# Patient Record
Sex: FEMALE | Race: OTHER | URBAN - METROPOLITAN AREA
[De-identification: names, ages, dates, MRNs, and addresses within clinical notes are randomized per-mention and may not be internally consistent; named-entity substitution may affect disease eponyms.]

---

## 2017-06-25 ENCOUNTER — INPATIENT (INPATIENT)
Facility: HOSPITAL | Age: 57
LOS: 3 days | Discharge: HOME | End: 2017-06-29
Attending: INTERNAL MEDICINE

## 2017-06-25 DIAGNOSIS — I67.83 POSTERIOR REVERSIBLE ENCEPHALOPATHY SYNDROME: ICD-10-CM

## 2017-06-25 DIAGNOSIS — J96.00 ACUTE RESPIRATORY FAILURE, UNSPECIFIED WHETHER WITH HYPOXIA OR HYPERCAPNIA: ICD-10-CM

## 2017-06-25 DIAGNOSIS — I10 ESSENTIAL (PRIMARY) HYPERTENSION: ICD-10-CM

## 2017-06-25 DIAGNOSIS — E16.2 HYPOGLYCEMIA, UNSPECIFIED: ICD-10-CM

## 2017-06-25 DIAGNOSIS — T40.601A POISONING BY UNSPECIFIED NARCOTICS, ACCIDENTAL (UNINTENTIONAL), INITIAL ENCOUNTER: ICD-10-CM

## 2017-06-25 DIAGNOSIS — F93.0 SEPARATION ANXIETY DISORDER OF CHILDHOOD: ICD-10-CM

## 2017-06-25 DIAGNOSIS — E11.9 TYPE 2 DIABETES MELLITUS WITHOUT COMPLICATIONS: ICD-10-CM

## 2017-06-25 DIAGNOSIS — J18.0 BRONCHOPNEUMONIA, UNSPECIFIED ORGANISM: ICD-10-CM

## 2017-06-25 DIAGNOSIS — R41.89 OTHER SYMPTOMS AND SIGNS INVOLVING COGNITIVE FUNCTIONS AND AWARENESS: ICD-10-CM
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
## 2017-07-03 DIAGNOSIS — I50.9 HEART FAILURE, UNSPECIFIED: ICD-10-CM

## 2017-07-03 DIAGNOSIS — B19.20 UNSPECIFIED VIRAL HEPATITIS C WITHOUT HEPATIC COMA: ICD-10-CM

## 2017-07-03 DIAGNOSIS — Z78.1 PHYSICAL RESTRAINT STATUS: ICD-10-CM

## 2017-07-03 DIAGNOSIS — F32.9 MAJOR DEPRESSIVE DISORDER, SINGLE EPISODE, UNSPECIFIED: ICD-10-CM

## 2017-07-03 DIAGNOSIS — G40.909 EPILEPSY, UNSPECIFIED, NOT INTRACTABLE, WITHOUT STATUS EPILEPTICUS: ICD-10-CM

## 2017-07-03 DIAGNOSIS — R41.82 ALTERED MENTAL STATUS, UNSPECIFIED: ICD-10-CM

## 2017-07-03 DIAGNOSIS — J44.9 CHRONIC OBSTRUCTIVE PULMONARY DISEASE, UNSPECIFIED: ICD-10-CM
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
## 2017-07-03 DIAGNOSIS — E11.9 TYPE 2 DIABETES MELLITUS WITHOUT COMPLICATIONS: ICD-10-CM

## 2017-07-03 DIAGNOSIS — F11.20 OPIOID DEPENDENCE, UNCOMPLICATED: ICD-10-CM

## 2017-07-03 DIAGNOSIS — I11.0 HYPERTENSIVE HEART DISEASE WITH HEART FAILURE: ICD-10-CM

## 2017-07-03 DIAGNOSIS — J69.0 PNEUMONITIS DUE TO INHALATION OF FOOD AND VOMIT: ICD-10-CM

## 2017-07-03 DIAGNOSIS — F17.210 NICOTINE DEPENDENCE, CIGARETTES, UNCOMPLICATED: ICD-10-CM

## 2017-07-03 DIAGNOSIS — T40.3X1A POISONING BY METHADONE, ACCIDENTAL (UNINTENTIONAL), INITIAL ENCOUNTER: ICD-10-CM

## 2017-07-03 DIAGNOSIS — Z99.81 DEPENDENCE ON SUPPLEMENTAL OXYGEN: ICD-10-CM

## 2017-07-03 DIAGNOSIS — J96.22 ACUTE AND CHRONIC RESPIRATORY FAILURE WITH HYPERCAPNIA: ICD-10-CM

## 2019-09-05 ENCOUNTER — APPOINTMENT (OUTPATIENT)
Dept: VASCULAR SURGERY | Facility: CLINIC | Age: 59
End: 2019-09-05

## 2021-01-20 ENCOUNTER — APPOINTMENT (OUTPATIENT)
Dept: SURGERY | Facility: CLINIC | Age: 61
End: 2021-01-20

## 2023-12-27 ENCOUNTER — APPOINTMENT (OUTPATIENT)
Dept: NEUROLOGY | Facility: CLINIC | Age: 63
End: 2023-12-27

## 2024-02-05 ENCOUNTER — APPOINTMENT (OUTPATIENT)
Dept: NEUROLOGY | Facility: CLINIC | Age: 64
End: 2024-02-05
Payer: MEDICARE

## 2024-02-05 VITALS
SYSTOLIC BLOOD PRESSURE: 185 MMHG | BODY MASS INDEX: 33.13 KG/M2 | HEIGHT: 62 IN | DIASTOLIC BLOOD PRESSURE: 101 MMHG | WEIGHT: 180 LBS | HEART RATE: 49 BPM

## 2024-02-05 PROCEDURE — 99203 OFFICE O/P NEW LOW 30 MIN: CPT

## 2024-02-05 PROCEDURE — G2211 COMPLEX E/M VISIT ADD ON: CPT

## 2024-02-05 RX ORDER — METHADONE HYDROCHLORIDE 10 MG/1
10 TABLET ORAL
Refills: 0 | Status: ACTIVE | COMMUNITY

## 2024-02-05 RX ORDER — HYDROMORPHONE HYDROCHLORIDE 4 MG/1
4 TABLET ORAL
Refills: 0 | Status: ACTIVE | COMMUNITY

## 2024-02-05 RX ORDER — APIXABAN 5 MG/1
5 TABLET, FILM COATED ORAL
Refills: 0 | Status: ACTIVE | COMMUNITY

## 2024-02-05 NOTE — HISTORY OF PRESENT ILLNESS
[FreeTextEntry1] : This is a 63 year old female LH with history of R lung ca (3 years ago), L lung ca (1 year ago), and anal ca (s/p chemo and radiation), now in remission.  DVT and ?PE (on Eliquis), COPD on (3L NC baseline), seizures (seen on paper chart from 2016 -> previously on Keppra) who presents for evaluation of tremors of b/l hands for the past few years, thinks 2-3 years. They have been intermittent but have improved over the past few months. She does not think there is a resting component but with action such as drinking the tremor is bothersome. No changes to handwriting, no problems with fine motor movement. No LE involvement. Thinks anxiety makes it worst. Has been on continuo Has been having LLE pain since the cancer, has not had any imaging. Says pain is in the groin, entire leg, front and back and sharp. Says oncology put her on Dilaudid 4mg and Methadone 10mg. Is trying to establish care with pain management.   Doctors are in [place].   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

## 2024-02-05 NOTE — DISCUSSION/SUMMARY
[FreeTextEntry1] : This is a 63 year old female LH with history of R lung ca (3 years ago), L lung ca (1 year ago), and anal ca (s/p chemo and radiation), now in remission.  DVT and ?PE (on Eliquis), COPD on (3L NC baseline), seizures (seen on paper chart from 2016 -> previously on Keppra) who presents for evaluation of tremors of b/l hands for the past few years, thinks 2-3 years.  Description consistent with essential tremor and resolved after being placed on continuous NC, suspect hypercarbia induced. Given comorbids and now resolved will not treat. LLE pain possibly hip related, will start w/ screening XR and Ortho referral.  Plan: Tremor now resolved, continue to monitor Exercise and lifestyle management XR cervical thoracic and lumbar PT Ortho referral

## 2024-02-05 NOTE — PHYSICAL EXAM
[FreeTextEntry1] : GEN: NAD on 3L NC.  NEURO MENTAL STATUS: AAOx3,  CRANIAL NERVES: II: Pupils equal and reactive, III, IV, VI: EOM intact, +strabismus R eye V: normal sensation in V1, V2, and V3 segments bilaterally VII: no asymmetry, no nasolabial fold flattening VIII: normal hearing to speech IX, X: normal palatal elevation, no uvular deviation XI: 5/5 head turn and 5/5 shoulder shrug bilaterally XII: midline tongue protrusion MOTOR: 5/5 muscle power in b/l shoulder abductors/adductors, elbow flexors/extensors. 5/5 in the RUE. 4/5 with hip flexion, knee flexion/extension, and plantar flexors pain limited REFLEXES: 1/4 throughout SENSORY: Normal to touch, pinprick, temp all limbs. Vibration reduced LLE>RLE COORD: Normal finger to nose. Fine, faint low amplitude high frequency tremor b/l. No rigidty. No dysdiadochokinesia.  GAIT: antalgic w/ walker

## 2024-03-08 ENCOUNTER — APPOINTMENT (OUTPATIENT)
Dept: PAIN MANAGEMENT | Facility: CLINIC | Age: 64
End: 2024-03-08
Payer: MEDICARE

## 2024-03-08 VITALS — BODY MASS INDEX: 35.34 KG/M2 | HEIGHT: 60 IN | WEIGHT: 180 LBS

## 2024-03-08 DIAGNOSIS — R29.898 OTHER SYMPTOMS AND SIGNS INVOLVING THE MUSCULOSKELETAL SYSTEM: ICD-10-CM

## 2024-03-08 DIAGNOSIS — G89.29 OTHER CHRONIC PAIN: ICD-10-CM

## 2024-03-08 DIAGNOSIS — J44.9 CHRONIC OBSTRUCTIVE PULMONARY DISEASE, UNSPECIFIED: ICD-10-CM

## 2024-03-08 DIAGNOSIS — Z85.048 PERSONAL HISTORY OF OTHER MALIGNANT NEOPLASM OF RECTUM, RECTOSIGMOID JUNCTION, AND ANUS: ICD-10-CM

## 2024-03-08 DIAGNOSIS — R25.1 TREMOR, UNSPECIFIED: ICD-10-CM

## 2024-03-08 DIAGNOSIS — Z85.118 PERSONAL HISTORY OF OTHER MALIGNANT NEOPLASM OF BRONCHUS AND LUNG: ICD-10-CM

## 2024-03-08 PROCEDURE — G2211 COMPLEX E/M VISIT ADD ON: CPT

## 2024-03-08 PROCEDURE — 99203 OFFICE O/P NEW LOW 30 MIN: CPT

## 2024-03-08 NOTE — DISCUSSION/SUMMARY
[de-identified] : A discussion regarding available pain management treatment options occurred with the patient. These included interventional, rehabilitative, pharmacological, and alternative modalities. We will proceed with the following:   Medication based treatment options: I had a long discussion with the patient regarding the chronic use of opioids. I advised her that I will not be able to continue her at this current regimen and dosage. I explained in detail that if she would like to continue on this medication, she would have to seek care elsewhere.     Complementary treatment options: - Patient was advised to stay away from any heavy lifting. If needed, she was advised to squat and not bend forward. - Initiate physician directed activity and lifestyle modifications.  I, Sanjay Antonio, attest that this documentation has been prepared under the direction and in the presence of Provider Vic Ford, DO The documentation recorded by the scribe, in my presence, accurately reflects the service I personally performed, and the decisions made by me with my edits as appropriate.   Best Regards, Vic Ford D.O.

## 2024-03-08 NOTE — HISTORY OF PRESENT ILLNESS
[FreeTextEntry1] : HISTORY OF PRESENT ILLNESS: Mrs. Hampton is a 63-year-old female with a history of right lung cancer (3 years ago), Left lung cancer (1 year ago), and anal cancer (s/p chemo and radiation), now in remission. She is complaining of pain is in the groin, entire leg, front and back and sharp. Says oncology put her on Dilaudid 4mg and Methadone 10mg.  Patient describes the pain as severe.  During the last month the pain has been nearly constant with symptoms worsening in no typical pattern. Pain described as constant and daily in nature. Pain is increased with standing, walking, exercise.    ACTIVITIES: Patient could walk 0 blocks before the pain starts. Patient cannot stand before pain starts.  Patient uses a wheelchair at this time.  Patient has difficulty performing household chores, going to work, doing yardwork or shopping, socializing with friends, participating in recreational activities & exercise at this time.   Prior Pain Medications: Methadone, Dilaudid, gabapentin, Tylenol.

## 2024-03-10 PROBLEM — R29.898 LEG WEAKNESS: Status: ACTIVE | Noted: 2024-02-05

## 2024-03-10 PROBLEM — R25.1 TREMOR: Status: ACTIVE | Noted: 2024-02-05

## 2024-03-10 PROBLEM — G89.29 CHRONIC PAIN: Status: ACTIVE | Noted: 2024-03-10

## 2024-03-18 ENCOUNTER — APPOINTMENT (OUTPATIENT)
Dept: ORTHOPEDIC SURGERY | Facility: CLINIC | Age: 64
End: 2024-03-18

## 2024-04-14 ENCOUNTER — INPATIENT (INPATIENT)
Facility: HOSPITAL | Age: 64
LOS: 2 days | Discharge: HOME HEALTH | DRG: 300 | End: 2024-04-17
Attending: STUDENT IN AN ORGANIZED HEALTH CARE EDUCATION/TRAINING PROGRAM | Admitting: INTERNAL MEDICINE
Payer: MEDICARE

## 2024-04-14 VITALS
DIASTOLIC BLOOD PRESSURE: 72 MMHG | RESPIRATION RATE: 16 BRPM | HEART RATE: 95 BPM | WEIGHT: 179.9 LBS | TEMPERATURE: 98 F | OXYGEN SATURATION: 99 % | SYSTOLIC BLOOD PRESSURE: 163 MMHG

## 2024-04-14 DIAGNOSIS — G89.29 OTHER CHRONIC PAIN: ICD-10-CM

## 2024-04-14 DIAGNOSIS — Z87.891 PERSONAL HISTORY OF NICOTINE DEPENDENCE: ICD-10-CM

## 2024-04-14 DIAGNOSIS — J44.9 CHRONIC OBSTRUCTIVE PULMONARY DISEASE, UNSPECIFIED: ICD-10-CM

## 2024-04-14 DIAGNOSIS — I87.2 VENOUS INSUFFICIENCY (CHRONIC) (PERIPHERAL): ICD-10-CM

## 2024-04-14 DIAGNOSIS — D64.9 ANEMIA, UNSPECIFIED: ICD-10-CM

## 2024-04-14 DIAGNOSIS — E16.2 HYPOGLYCEMIA, UNSPECIFIED: ICD-10-CM

## 2024-04-14 DIAGNOSIS — L03.90 CELLULITIS, UNSPECIFIED: ICD-10-CM

## 2024-04-14 DIAGNOSIS — F11.20 OPIOID DEPENDENCE, UNCOMPLICATED: ICD-10-CM

## 2024-04-14 DIAGNOSIS — M16.0 BILATERAL PRIMARY OSTEOARTHRITIS OF HIP: ICD-10-CM

## 2024-04-14 DIAGNOSIS — K43.9 VENTRAL HERNIA WITHOUT OBSTRUCTION OR GANGRENE: ICD-10-CM

## 2024-04-14 DIAGNOSIS — L97.921 NON-PRESSURE CHRONIC ULCER OF UNSPECIFIED PART OF LEFT LOWER LEG LIMITED TO BREAKDOWN OF SKIN: ICD-10-CM

## 2024-04-14 DIAGNOSIS — Z79.01 LONG TERM (CURRENT) USE OF ANTICOAGULANTS: ICD-10-CM

## 2024-04-14 DIAGNOSIS — Z92.21 PERSONAL HISTORY OF ANTINEOPLASTIC CHEMOTHERAPY: ICD-10-CM

## 2024-04-14 DIAGNOSIS — D69.6 THROMBOCYTOPENIA, UNSPECIFIED: ICD-10-CM

## 2024-04-14 DIAGNOSIS — Z99.81 DEPENDENCE ON SUPPLEMENTAL OXYGEN: ICD-10-CM

## 2024-04-14 DIAGNOSIS — C21.0 MALIGNANT NEOPLASM OF ANUS, UNSPECIFIED: ICD-10-CM

## 2024-04-14 DIAGNOSIS — Z92.3 PERSONAL HISTORY OF IRRADIATION: ICD-10-CM

## 2024-04-14 DIAGNOSIS — Z88.0 ALLERGY STATUS TO PENICILLIN: ICD-10-CM

## 2024-04-14 DIAGNOSIS — C34.90 MALIGNANT NEOPLASM OF UNSPECIFIED PART OF UNSPECIFIED BRONCHUS OR LUNG: ICD-10-CM

## 2024-04-14 LAB
ALBUMIN SERPL ELPH-MCNC: 3.5 G/DL — SIGNIFICANT CHANGE UP (ref 3.5–5.2)
ALP SERPL-CCNC: 101 U/L — SIGNIFICANT CHANGE UP (ref 30–115)
ALT FLD-CCNC: 15 U/L — SIGNIFICANT CHANGE UP (ref 0–41)
ANION GAP SERPL CALC-SCNC: 8 MMOL/L — SIGNIFICANT CHANGE UP (ref 7–14)
AST SERPL-CCNC: 24 U/L — SIGNIFICANT CHANGE UP (ref 0–41)
BASOPHILS # BLD AUTO: 0 K/UL — SIGNIFICANT CHANGE UP (ref 0–0.2)
BASOPHILS NFR BLD AUTO: 0 % — SIGNIFICANT CHANGE UP (ref 0–1)
BILIRUB SERPL-MCNC: 0.2 MG/DL — SIGNIFICANT CHANGE UP (ref 0.2–1.2)
BUN SERPL-MCNC: 25 MG/DL — HIGH (ref 10–20)
CALCIUM SERPL-MCNC: 8.6 MG/DL — SIGNIFICANT CHANGE UP (ref 8.4–10.5)
CHLORIDE SERPL-SCNC: 94 MMOL/L — LOW (ref 98–110)
CO2 SERPL-SCNC: 35 MMOL/L — HIGH (ref 17–32)
CREAT SERPL-MCNC: 1 MG/DL — SIGNIFICANT CHANGE UP (ref 0.7–1.5)
EGFR: 63 ML/MIN/1.73M2 — SIGNIFICANT CHANGE UP
EOSINOPHIL # BLD AUTO: 0.15 K/UL — SIGNIFICANT CHANGE UP (ref 0–0.7)
EOSINOPHIL NFR BLD AUTO: 2.6 % — SIGNIFICANT CHANGE UP (ref 0–8)
GLUCOSE SERPL-MCNC: 68 MG/DL — LOW (ref 70–99)
HCT VFR BLD CALC: 24.5 % — LOW (ref 37–47)
HGB BLD-MCNC: 7.7 G/DL — LOW (ref 12–16)
LACTATE SERPL-SCNC: 0.9 MMOL/L — SIGNIFICANT CHANGE UP (ref 0.7–2)
LYMPHOCYTES # BLD AUTO: 0.15 K/UL — LOW (ref 1.2–3.4)
LYMPHOCYTES # BLD AUTO: 2.6 % — LOW (ref 20.5–51.1)
MAGNESIUM SERPL-MCNC: 1.9 MG/DL — SIGNIFICANT CHANGE UP (ref 1.8–2.4)
MANUAL SMEAR VERIFICATION: SIGNIFICANT CHANGE UP
MCHC RBC-ENTMCNC: 31 PG — SIGNIFICANT CHANGE UP (ref 27–31)
MCHC RBC-ENTMCNC: 31.4 G/DL — LOW (ref 32–37)
MCV RBC AUTO: 98.8 FL — SIGNIFICANT CHANGE UP (ref 81–99)
MONOCYTES # BLD AUTO: 0.31 K/UL — SIGNIFICANT CHANGE UP (ref 0.1–0.6)
MONOCYTES NFR BLD AUTO: 5.2 % — SIGNIFICANT CHANGE UP (ref 1.7–9.3)
NEUTROPHILS # BLD AUTO: 5.26 K/UL — SIGNIFICANT CHANGE UP (ref 1.4–6.5)
NEUTROPHILS NFR BLD AUTO: 88.7 % — HIGH (ref 42.2–75.2)
PLAT MORPH BLD: NORMAL — SIGNIFICANT CHANGE UP
PLATELET # BLD AUTO: 123 K/UL — LOW (ref 130–400)
PMV BLD: 9.6 FL — SIGNIFICANT CHANGE UP (ref 7.4–10.4)
POTASSIUM SERPL-MCNC: 5.2 MMOL/L — HIGH (ref 3.5–5)
POTASSIUM SERPL-SCNC: 5.2 MMOL/L — HIGH (ref 3.5–5)
PROT SERPL-MCNC: 7.1 G/DL — SIGNIFICANT CHANGE UP (ref 6–8)
RBC # BLD: 2.48 M/UL — LOW (ref 4.2–5.4)
RBC # FLD: 13.2 % — SIGNIFICANT CHANGE UP (ref 11.5–14.5)
RBC BLD AUTO: ABNORMAL
SODIUM SERPL-SCNC: 137 MMOL/L — SIGNIFICANT CHANGE UP (ref 135–146)
STOMATOCYTES BLD QL SMEAR: SLIGHT — SIGNIFICANT CHANGE UP
VARIANT LYMPHS # BLD: 0.9 % — SIGNIFICANT CHANGE UP (ref 0–5)
WBC # BLD: 5.93 K/UL — SIGNIFICANT CHANGE UP (ref 4.8–10.8)
WBC # FLD AUTO: 5.93 K/UL — SIGNIFICANT CHANGE UP (ref 4.8–10.8)

## 2024-04-14 PROCEDURE — 93925 LOWER EXTREMITY STUDY: CPT

## 2024-04-14 PROCEDURE — 82962 GLUCOSE BLOOD TEST: CPT

## 2024-04-14 PROCEDURE — 99285 EMERGENCY DEPT VISIT HI MDM: CPT

## 2024-04-14 PROCEDURE — 86900 BLOOD TYPING SEROLOGIC ABO: CPT

## 2024-04-14 PROCEDURE — 87070 CULTURE OTHR SPECIMN AEROBIC: CPT

## 2024-04-14 PROCEDURE — 86850 RBC ANTIBODY SCREEN: CPT

## 2024-04-14 PROCEDURE — 80202 ASSAY OF VANCOMYCIN: CPT

## 2024-04-14 PROCEDURE — 83880 ASSAY OF NATRIURETIC PEPTIDE: CPT

## 2024-04-14 PROCEDURE — 73590 X-RAY EXAM OF LOWER LEG: CPT | Mod: 26,LT,RT

## 2024-04-14 PROCEDURE — 80048 BASIC METABOLIC PNL TOTAL CA: CPT

## 2024-04-14 PROCEDURE — 97162 PT EVAL MOD COMPLEX 30 MIN: CPT | Mod: GP

## 2024-04-14 PROCEDURE — 85027 COMPLETE CBC AUTOMATED: CPT

## 2024-04-14 PROCEDURE — 83010 ASSAY OF HAPTOGLOBIN QUANT: CPT

## 2024-04-14 PROCEDURE — 97116 GAIT TRAINING THERAPY: CPT | Mod: GP

## 2024-04-14 PROCEDURE — 83735 ASSAY OF MAGNESIUM: CPT

## 2024-04-14 PROCEDURE — 93005 ELECTROCARDIOGRAM TRACING: CPT

## 2024-04-14 PROCEDURE — 86901 BLOOD TYPING SEROLOGIC RH(D): CPT

## 2024-04-14 PROCEDURE — 93970 EXTREMITY STUDY: CPT

## 2024-04-14 PROCEDURE — 36415 COLL VENOUS BLD VENIPUNCTURE: CPT

## 2024-04-14 RX ORDER — METHADONE HYDROCHLORIDE 40 MG/1
30 TABLET ORAL DAILY
Refills: 0 | Status: DISCONTINUED | OUTPATIENT
Start: 2024-04-14 | End: 2024-04-15

## 2024-04-14 RX ORDER — MORPHINE SULFATE 50 MG/1
6 CAPSULE, EXTENDED RELEASE ORAL ONCE
Refills: 0 | Status: DISCONTINUED | OUTPATIENT
Start: 2024-04-14 | End: 2024-04-14

## 2024-04-14 RX ORDER — ONDANSETRON 8 MG/1
4 TABLET, FILM COATED ORAL EVERY 8 HOURS
Refills: 0 | Status: DISCONTINUED | OUTPATIENT
Start: 2024-04-14 | End: 2024-04-17

## 2024-04-14 RX ORDER — ACETAMINOPHEN 500 MG
650 TABLET ORAL EVERY 6 HOURS
Refills: 0 | Status: DISCONTINUED | OUTPATIENT
Start: 2024-04-14 | End: 2024-04-15

## 2024-04-14 RX ORDER — LANOLIN ALCOHOL/MO/W.PET/CERES
3 CREAM (GRAM) TOPICAL AT BEDTIME
Refills: 0 | Status: DISCONTINUED | OUTPATIENT
Start: 2024-04-14 | End: 2024-04-17

## 2024-04-14 RX ORDER — VANCOMYCIN HCL 1 G
1000 VIAL (EA) INTRAVENOUS ONCE
Refills: 0 | Status: COMPLETED | OUTPATIENT
Start: 2024-04-14 | End: 2024-04-14

## 2024-04-14 RX ORDER — AZTREONAM 2 G
2000 VIAL (EA) INJECTION ONCE
Refills: 0 | Status: COMPLETED | OUTPATIENT
Start: 2024-04-14 | End: 2024-04-14

## 2024-04-14 RX ADMIN — Medication 250 MILLIGRAM(S): at 16:28

## 2024-04-14 RX ADMIN — Medication 50 MILLIGRAM(S): at 16:28

## 2024-04-14 NOTE — ED PROVIDER NOTE - PHYSICAL EXAMINATION
CONSTITUTIONAL: NAD  SKIN: Warm dry  HEAD: NCAT  EYES: NL inspection  ENT: MMM  NECK: Supple; non tender.  CARD: RRR  RESP: CTAB  ABD: S/NT no R/G  EXT: +BL pedal edema, erythema and warmth to shins with clear discharge, abrasion to LT shin with purulence  NEURO: Grossly unremarkable  PSYCH: Cooperative, appropriate.

## 2024-04-14 NOTE — ED PROVIDER NOTE - OBJECTIVE STATEMENT
63-year-old female history of COPD on LT, chronic pain, ambulates normally with wheelchair presents with bilateral leg pain and bilateral leg swelling.  Patient admits began having weeping of her lower extremities redness and pain past few days.  Cipro PCP started clindamycin which she took 2 days worth.  Admits was admitted to [facility] 3 months ago with similar symptoms.  States a subjective fever.  Recently saw pain management and started on methadone and oxycodone for pain    Has not been able to ambulate recently 2/2 pain

## 2024-04-14 NOTE — H&P ADULT - ATTENDING COMMENTS
#LLE ulcer  xray periosteal thickening tibia  bcx  deep wcx  vanco, ceftriaxone, flagyl  no intervention per burn  art duplex  id eval    #Progress Note Handoff  Pending (specify): iv abx, id eval, bcx, wcx  Family discussion: d/w pt, daughter at bedside  Disposition: snf vs. home

## 2024-04-14 NOTE — H&P ADULT - HISTORY OF PRESENT ILLNESS
63F w/ PMHx COPD on LT, Chronic Pain and h/o Opioid Abuse presents to ED For bilateral leg pain and bilateral leg swelling. Patient admits to having LE weeping, redness and pain for the past few days with associated subjective fever. Her PCP started her on clindamycin which she took 2 days worth. States she was admitted to [facility] 3 months ago with similar symptoms. Of note, patient was recently seen by pain management and started on methadone and oxycodone for pain. She has been unable to ambulate due to pain (baseline wheelchair).    Vitals: Temp 97.9F, /72, HR 95, RR 16, SpO2 99% on RA    Labs: Hgb 7.7 (unknown baseline), Serum Glucose 68, Cr 1.0 (unknown baseline)    Imaging: X ray    In the ED:    Admitted to medicine 63F ex-smoker w/ PMHx COPD on 3L NC, h/o Opioid Abuse, Chronic Pain, h/o Lung Ca s/p Radiation (~2022) and h/o Anal Ca s/p Chemo/Radiation (~2021) presents to ED For bilateral leg pain and bilateral leg swelling. Patient admits to having LE weeping, redness and pain for the past few days with associated subjective fever. Her PCP started her on clindamycin which she took 2 days worth. States she was admitted to [facility] 3 months ago with similar symptoms and treated with IV antibiotics. Of note, patient was recently seen by pain management and started on Methadone and Dilaudid for pain. She has been unable to ambulate due to pain (baseline rolling walker).    Vitals: Temp 97.9F, /72, HR 95, RR 16, SpO2 99% on RA    Labs: Hgb 7.7 (unknown baseline), Serum Glucose 68, Cr 1.0 (unknown baseline)    Imaging: Xray of b/l Tib-Fib shows no fracture    In the ED:  - Aztreonam and Vanc IV x1  - Morphine 6mg IV x1    Admitted to medicine for management of b/l LE wound. 63F ex-smoker w/ PMHx COPD on 3L NC, h/o Opioid Abuse, Chronic Pain, h/o Lung Ca s/p Radiation (~2022) and h/o Anal Ca s/p Chemo/Radiation (~2021) presents to ED For bilateral leg pain and bilateral leg swelling. Patient admits to having LE weeping, redness and pain for the past few days with associated subjective fever. Her PCP started her on clindamycin which she took 2 days worth. States she was admitted to [facility] 3 months ago with similar symptoms and treated with IV antibiotics. Of note, patient was recently seen by pain management and started on Methadone and Dilaudid for pain. She has been unable to ambulate due to pain (baseline rolling walker).    Vitals: Temp 97.9F, /72, HR 95, RR 16, SpO2 99% on RA    Labs: Hgb 7.7 (unknown baseline), Platelet 123 (unknown baseline), Serum Glucose 68, Cr 1.0 (unknown baseline)    Imaging: Xray of b/l Tib-Fib shows no fracture    In the ED:  - Aztreonam and Vanc IV x1  - Morphine 6mg IV x1    Admitted to medicine for management of b/l LE wound.

## 2024-04-14 NOTE — H&P ADULT - NSHPLABSRESULTS_GEN_ALL_CORE
LABS:  cret                        7.7    5.93  )-----------( 123      ( 14 Apr 2024 16:04 )             24.5     04-14    137  |  94<L>  |  25<H>  ----------------------------<  68<L>  5.2<H>   |  35<H>  |  1.0    Ca    8.6      14 Apr 2024 16:04  Mg     1.9     04-14    TPro  7.1  /  Alb  3.5  /  TBili  0.2  /  DBili  x   /  AST  24  /  ALT  15  /  AlkPhos  101  04-14

## 2024-04-14 NOTE — H&P ADULT - NSHPPHYSICALEXAM_GEN_ALL_CORE
VITALS:   Vital Signs Last 24 Hrs  T(C): 36.8 (14 Apr 2024 16:18), Max: 36.8 (14 Apr 2024 16:18)  T(F): 98.2 (14 Apr 2024 16:18), Max: 98.2 (14 Apr 2024 16:18)  HR: 90 (14 Apr 2024 17:11) (90 - 97)  BP: 130/60 (14 Apr 2024 17:11) (130/60 - 180/73)  RR: 18 (14 Apr 2024 17:11) (16 - 18)  SpO2: 99% (14 Apr 2024 17:11) (96% - 99%)      Parameters below as of 14 Apr 2024 17:11  Patient On (Oxygen Delivery Method): room air      PHYSICAL EXAM:  General: WN/WD NAD  HEENT: PERRLA, EOMI, moist mucous membranes  Neurology: A&Ox3, nonfocal, WAYNE x 4  Respiratory: CTA B/L, normal respiratory effort, no wheezes, crackles, rales  CV: RRR, S1S2, no murmurs, rubs or gallops  Abdominal: Soft, NT, ND +BS, Last BM  Extremities: No edema, + peripheral pulses VITALS:   Vital Signs Last 24 Hrs  T(C): 36.8 (14 Apr 2024 16:18), Max: 36.8 (14 Apr 2024 16:18)  T(F): 98.2 (14 Apr 2024 16:18), Max: 98.2 (14 Apr 2024 16:18)  HR: 90 (14 Apr 2024 17:11) (90 - 97)  BP: 130/60 (14 Apr 2024 17:11) (130/60 - 180/73)  RR: 18 (14 Apr 2024 17:11) (16 - 18)  SpO2: 99% (14 Apr 2024 17:11) (96% - 99%)      Parameters below as of 14 Apr 2024 17:11  Patient On (Oxygen Delivery Method): room air      PHYSICAL EXAM:  General: WN/WD NAD, disheveled  HEENT: NCAT, PERRLA, EOMI, moist mucous membranes  Neurology: A&Ox3, nonfocal, WAYNE x 4  Respiratory: CTA B/L, normal respiratory effort, no wheezes, crackles, rales  CV: RRR, S1S2, no murmurs, rubs or gallops  Abdominal: Soft, NT, ND, +BS, no guarding or rebound, multiple ventral hernias, midline surgical abdominal scar  Extremities: +2 LE edema b/l, b/l open LE wounds, weeping and red VITALS:   Vital Signs Last 24 Hrs  T(C): 36.8 (14 Apr 2024 16:18), Max: 36.8 (14 Apr 2024 16:18)  T(F): 98.2 (14 Apr 2024 16:18), Max: 98.2 (14 Apr 2024 16:18)  HR: 90 (14 Apr 2024 17:11) (90 - 97)  BP: 130/60 (14 Apr 2024 17:11) (130/60 - 180/73)  RR: 18 (14 Apr 2024 17:11) (16 - 18)  SpO2: 99% (14 Apr 2024 17:11) (96% - 99%)      Parameters below as of 14 Apr 2024 17:11  Patient On (Oxygen Delivery Method): room air      PHYSICAL EXAM:  General: WN/WD NAD, disheveled  HEENT: NCAT, PERRLA, EOMI, moist mucous membranes  Neurology: A&Ox3, nonfocal, WAYNE x 4  Respiratory: CTA B/L, normal respiratory effort, no wheezes, crackles, rales  CV: RRR, S1S2, no murmurs, rubs or gallops  Abdominal: Soft, NT, ND, +BS, no guarding or rebound, multiple ventral hernias, midline surgical abdominal scar  Extremities: +2 LE edema b/l, b/l open LE wounds, weeping and erythematous, chronic changes

## 2024-04-14 NOTE — ED PROVIDER NOTE - CLINICAL SUMMARY MEDICAL DECISION MAKING FREE TEXT BOX
Patient with past medical history opiate abuse on hydromorphone and methadone presents with leg infection not improved after clindamycin admitted to [facility] with similar symptoms    Independent interpretation of the Xray film(s) performed by: Dawson Grossman fracture    Appropriate medications for patient's presenting complaints were ordered and effects were reassessed.   Patient's external records were reviewed.    Escalation to admission/observation was considered.  At this time, patient requires inpatient hospitalization- monitored setting.

## 2024-04-14 NOTE — ED ADULT NURSE NOTE - NSFALLHARMRISKINTERV_ED_ALL_ED

## 2024-04-14 NOTE — H&P ADULT - ASSESSMENT
63F ex-smoker w/ PMHx COPD on 3L NC, h/o Opioid Abuse, Chronic Pain, h/o Lung Ca s/p Radiation (~2022) and h/o Anal Ca s/p Chemo/Radiation (~2021) presents to ED For bilateral leg pain and bilateral leg swelling. Patient admits to having LE weeping, redness and pain for the past few days with associated subjective fever. Her PCP started her on clindamycin which she took 2 days worth. States she was admitted to [facility] 3 months ago with similar symptoms and treated with IV antibiotics. Of note, patient was recently seen by pain management and started on Methadone and Dilaudid for pain. She has been unable to ambulate due to pain (baseline rolling walker).    #b/l LE Stasis Wound vs Cellulitis  - b/l LE open wounds, weeping, red  - Vitals: Temp 97.9F, /72, HR 95, RR 16, SpO2 99% on RA  - WBC wnl  - Aztreonam and Vanc IV x1  - patient states shes not sure if she actually has a penicillin allergy, states that her mother told her as a kid    #b/l Hip Pain  #Chronic Pain  #h/o Opioid Abuse  - Xray of b/l Tib-Fib shows no fracture  - Morphine 6mg IV x1    #COPD on 3L NC at home  - satting well on 3L NC  - no wheezing, not SOB, not in exacerbation    Labs: Hgb 7.7 (unknown baseline), Serum Glucose 68, Cr 1.0 (unknown baseline) 63F ex-smoker w/ PMHx COPD on 3L NC, h/o Opioid Abuse, Chronic Pain, h/o Lung Ca s/p Radiation (~2022) and h/o Anal Ca s/p Chemo/Radiation (~2021) presents to ED For bilateral leg pain and bilateral leg swelling. Patient admits to having LE weeping, redness and pain for the past few days with associated subjective fever. Her PCP started her on clindamycin which she took 2 days worth. States she was admitted to [facility] 3 months ago with similar symptoms and treated with IV antibiotics. Of note, patient was recently seen by pain management and started on Methadone and Dilaudid for pain. She has been unable to ambulate due to pain (baseline rolling walker).    #b/l LE Stasis Wound vs Cellulitis  - b/l LE open wounds, weeping, red  - Vitals: Temp 97.9F, /72, HR 95, RR 16, SpO2 99% on RA  - WBC wnl  - Aztreonam and Vanc IV x1  - patient states shes not sure if she actually has a penicillin allergy, states that her mother told her as a kid    #b/l Hip Pain  #Chronic Pain  #Methadone Dependence  #h/o Opioid Abuse  - Xray of b/l Tib-Fib shows no fracture  - s/p Morphine 6mg IV x1 in ED  - patient states she was prescribed Methadone 50mg q8hrs standing and Dilaudid 8mg PO q4hrs PRN  - iSTOP reviewed, patient is only currently prescribed Methadone 30mg qD  - patient states it was an insurance issue and that shes actually prescribed 50mg TID  - patient follows Dr. Harvey Benton [phone] for pain management ([place])  -   - f/u pain management consult    #COPD on 3L NC at home  - satting well on 3L NC  - no wheezing, not SOB, not in exacerbation    #h/o Lung Ca s/p Radiation - unknown stage  #h/o Anal Ca s/p Chemo/Radiation - unknown stage  - patient states she is following Dr. Serra at [facility]  - o/p f/u    #Anemia  - Hgb 7.7 (unknown baseline)  - no clear signs of active hemorrhage except from open wound in b/l LE  - monitor H&H  - active T&S  - transfuse <7    Serum Glucose 68, Cr 1.0 (unknown baseline)     63F ex-smoker w/ PMHx COPD on 3L NC, h/o Opioid Abuse, Chronic Pain, h/o Lung Ca s/p Radiation (~2022) and h/o Anal Ca s/p Chemo/Radiation (~2021) presents to ED For bilateral leg pain and bilateral leg swelling. Patient admits to having LE weeping, redness and pain for the past few days with associated subjective fever. Her PCP started her on clindamycin which she took 2 days worth. States she was admitted to [facility] 3 months ago with similar symptoms and treated with IV antibiotics. Of note, patient was recently seen by pain management and started on Methadone and Dilaudid for pain. She has been unable to ambulate due to pain (baseline rolling walker).    #b/l LE Stasis Wound vs Cellulitis  #Possible PAD / PVD w/ questionable Stent  - b/l LE open wounds, weeping, red  - Vitals: Temp 97.9F, /72, HR 95, RR 16, SpO2 99% on RA  - WBC wnl  - Aztreonam and Vanc IV x1  - patient states shes not sure if she actually has a penicillin allergy, states that her mother told her as a kid  - c/w home Eliquis 5mg BID - patient states she is on Eliquis for balloon/stent in LLE  - f/u BCx  - f/u BURN consult for eval and recs    #b/l Hip Pain likely Severe OA  #Chronic Pain  #Methadone Dependence  #h/o Opioid Abuse  - Xray of b/l Tib-Fib shows no fracture  - no red flag signs  - s/p Morphine 6mg IV x1 in ED  - patient states she was prescribed Methadone 50mg q8hrs standing and Dilaudid 8mg PO q4hrs PRN  - iSTOP reviewed, patient is only currently prescribed Methadone 30mg qD  - patient states it was an insurance issue and that shes actually prescribed 50mg TID  - patient follows Dr. Harvey Benton [phone] for pain management ([place])  - start Tylenol 975mg q8hr standing  - f/u pain management consult  - f/u PT    #Hypoglycemia  - Serum glucose 68 on admission  - not symptomatic  - monitor FS  - give juice of IV dextrose PRN    #Normocytic Anemia  #Thrombocytopenia  - Hgb 7.7 (unknown baseline)  - Platelet 123 (unknown baseline)  - no clear signs of active hemorrhage except from open wound in b/l LE  - patient is on Eliquis  - monitor H&H  - active T&S  - transfuse <7    #COPD on 3L NC at home  #Ex-smoker  - satting well on 3L NC  - no wheezing, not SOB, not in exacerbation  - c/w home Albuterol inhaler PRN    #h/o Lung Ca s/p Radiation - unknown stage  #h/o Anal Ca s/p Chemo/Radiation - unknown stage  - patient states she is [place] [facility]  - o/p f/u    #Multiple Ventral Hernias  #h/o Open Abdominal Surgery  - denies pain, +BS, last BM yesterday  - f/u o/p    #DVT ppx: Eliquis 5mg BID  #GI ppx: n/a  #Diet: Regular - DASH/TLC  #Activity: IAT - uses rolling walker at baseline  #Code Status: Full Code  #Dispo: from home, acute 63F ex-smoker w/ PMHx COPD on 3L NC, h/o Opioid Abuse, Chronic Pain, h/o Lung Ca s/p Radiation (~2022) and h/o Anal Ca s/p Chemo/Radiation (~2021) presents to ED For bilateral leg pain and bilateral leg swelling. Patient admits to having LE weeping, redness and pain for the past few days with associated subjective fever. Her PCP started her on clindamycin which she took 2 days worth. States she was admitted to [facility] 3 months ago with similar symptoms and treated with IV antibiotics. Of note, patient was recently seen by pain management and started on Methadone and Dilaudid for pain. She has been unable to ambulate due to pain (baseline rolling walker).    #b/l LE Stasis Wound vs Cellulitis  #Possible PAD / PVD w/ questionable Stent  - b/l LE open wounds, weeping, red  - Vitals: Temp 97.9F, /72, HR 95, RR 16, SpO2 99% on RA  - WBC wnl  - Aztreonam and Vanc IV x1  - patient states shes not sure if she actually has a penicillin allergy, states that her mother told her as a kid  - c/w home Eliquis 5mg BID - patient states she is on Eliquis for balloon/stent in LLE  - f/u BCx  - f/u b/l LE venous duplex  - f/u BURN consult for eval and recs    #b/l Hip Pain likely Severe OA  #Chronic Pain  #Methadone Dependence  #h/o Opioid Abuse  - Xray of b/l Tib-Fib shows no fracture  - no red flag signs  - s/p Morphine 6mg IV x1 in ED  - patient states she was prescribed Methadone 50mg q8hrs standing and Dilaudid 8mg PO q4hrs PRN  - iSTOP reviewed, patient is only currently prescribed Methadone 30mg qD  - patient states it was an insurance issue and that shes actually prescribed 50mg TID  - patient follows Dr. Harvey Benton [phone] for pain management ([place])  - start Tylenol 975mg q8hr standing  - f/u pain management consult  - f/u PT    #Hypoglycemia  - Serum glucose 68 on admission  - not symptomatic  - monitor FS  - give juice of IV dextrose PRN    #Normocytic Anemia  #Thrombocytopenia  - Hgb 7.7 (unknown baseline)  - Platelet 123 (unknown baseline)  - no clear signs of active hemorrhage except from open wound in b/l LE  - patient is on Eliquis  - monitor H&H  - active T&S  - transfuse <7    #COPD on 3L NC at home  #Ex-smoker  - satting well on 3L NC  - no wheezing, not SOB, not in exacerbation  - c/w home Albuterol inhaler PRN    #h/o Lung Ca s/p Radiation - unknown stage  #h/o Anal Ca s/p Chemo/Radiation - unknown stage  - patient states she is following Dr. Serra at [facility] [place]  - o/p f/u    #Multiple Ventral Hernias  #h/o Open Abdominal Surgery  - denies pain, +BS, last BM yesterday  - f/u o/p    #DVT ppx: Eliquis 5mg BID  #GI ppx: n/a  #Diet: Regular - DASH/TLC  #Activity: IAT - uses rolling walker at baseline  #Code Status: Full Code  #Dispo: from home, acute 63F ex-smoker w/ PMHx COPD on 3L NC, h/o Opioid Abuse, Chronic Pain, h/o Lung Ca s/p Radiation (~2022) and h/o Anal Ca s/p Chemo/Radiation (~2021) presents to ED For bilateral leg pain and bilateral leg swelling. Patient admits to having LE weeping, redness and pain for the past few days with associated subjective fever. Her PCP started her on clindamycin which she took 2 days worth. States she was admitted to [facility] 3 months ago with similar symptoms and treated with IV antibiotics. Of note, patient was recently seen by pain management and started on Methadone and Dilaudid for pain. She has been unable to ambulate due to pain (baseline rolling walker).    #b/l LE Stasis Dermatitis - Doubt Cellulitis  #Possible PAD / PVD w/ questionable Stent or LLE  - b/l LE open wounds, weeping, red  - Vitals: Temp 97.9F, /72, HR 95, RR 16, SpO2 99% on RA  - WBC wnl  - Aztreonam and Vanc IV x1  - patient states shes not sure if she actually has a penicillin allergy, states that her mother told her as a kid  - doubt cellulitis, monitor off abx for now  - c/w home Eliquis 5mg BID - patient states she is on Eliquis for balloon/stent in LLE  - f/u BCx  - f/u b/l LE arterial and venous duplex  - f/u BURN consult for eval and recs    #b/l Hip Pain likely Severe OA  #Chronic Pain  #Methadone Dependence  #h/o Opioid Abuse  - Xray of b/l Tib-Fib shows no fracture  - no red flag signs  - s/p Morphine 6mg IV x1 in ED  - patient states she was prescribed Methadone 50mg q8hrs standing and Dilaudid 8mg PO q4hrs PRN  - iSTOP reviewed, patient is only currently prescribed Methadone 30mg qD  - patient states it was an insurance issue and that shes actually prescribed 50mg TID  - patient follows Dr. Harvey Benton [phone] for pain management ([place])  - start Tylenol 975mg q8hr standing  - f/u pain management consult  - f/u PT    #Hypoglycemia  - Serum glucose 68 on admission  - not symptomatic  - monitor FS  - give juice of IV dextrose PRN    #Normocytic Anemia  #Thrombocytopenia  - Hgb 7.7 (unknown baseline)  - Platelet 123 (unknown baseline)  - no clear signs of active hemorrhage except from open wound in b/l LE  - patient is on Eliquis  - monitor H&H  - active T&S  - transfuse <7    #COPD on 3L NC at home  #Ex-smoker  - satting well on 3L NC  - no wheezing, not SOB, not in exacerbation  - c/w home Albuterol inhaler PRN    #h/o Lung Ca s/p Radiation - unknown stage  #h/o Anal Ca s/p Chemo/Radiation - unknown stage  - patient states she is following Dr. Serra at [facility] [place]  - o/p f/u    #Multiple Ventral Hernias  #h/o Open Abdominal Surgery  - denies pain, +BS, last BM yesterday  - f/u o/p    #DVT ppx: Eliquis 5mg BID  #GI ppx: n/a  #Diet: Regular - DASH/TLC  #Activity: IAT - uses rolling walker at baseline  #Code Status: Full Code  #Dispo: from home, acute

## 2024-04-14 NOTE — ED ADULT TRIAGE NOTE - CHIEF COMPLAINT QUOTE
pt BIBA for open wounds on bilat lower legs. pt is on antibiotics for wounds and c/o pain and swelling

## 2024-04-15 LAB
ANION GAP SERPL CALC-SCNC: 12 MMOL/L — SIGNIFICANT CHANGE UP (ref 7–14)
BLD GP AB SCN SERPL QL: SIGNIFICANT CHANGE UP
BUN SERPL-MCNC: 25 MG/DL — HIGH (ref 10–20)
CALCIUM SERPL-MCNC: 8.9 MG/DL — SIGNIFICANT CHANGE UP (ref 8.4–10.4)
CHLORIDE SERPL-SCNC: 95 MMOL/L — LOW (ref 98–110)
CO2 SERPL-SCNC: 31 MMOL/L — SIGNIFICANT CHANGE UP (ref 17–32)
CREAT SERPL-MCNC: 0.8 MG/DL — SIGNIFICANT CHANGE UP (ref 0.7–1.5)
EGFR: 82 ML/MIN/1.73M2 — SIGNIFICANT CHANGE UP
GLUCOSE BLDC GLUCOMTR-MCNC: 127 MG/DL — HIGH (ref 70–99)
GLUCOSE BLDC GLUCOMTR-MCNC: 80 MG/DL — SIGNIFICANT CHANGE UP (ref 70–99)
GLUCOSE SERPL-MCNC: 76 MG/DL — SIGNIFICANT CHANGE UP (ref 70–99)
HCT VFR BLD CALC: 23.8 % — LOW (ref 37–47)
HGB BLD-MCNC: 7.4 G/DL — LOW (ref 12–16)
MAGNESIUM SERPL-MCNC: 1.9 MG/DL — SIGNIFICANT CHANGE UP (ref 1.8–2.4)
MCHC RBC-ENTMCNC: 31 PG — SIGNIFICANT CHANGE UP (ref 27–31)
MCHC RBC-ENTMCNC: 31.1 G/DL — LOW (ref 32–37)
MCV RBC AUTO: 99.6 FL — HIGH (ref 81–99)
NRBC # BLD: 0 /100 WBCS — SIGNIFICANT CHANGE UP (ref 0–0)
NT-PROBNP SERPL-SCNC: 902 PG/ML — HIGH (ref 0–300)
PLATELET # BLD AUTO: 117 K/UL — LOW (ref 130–400)
PMV BLD: 9.8 FL — SIGNIFICANT CHANGE UP (ref 7.4–10.4)
POTASSIUM SERPL-MCNC: 4.6 MMOL/L — SIGNIFICANT CHANGE UP (ref 3.5–5)
POTASSIUM SERPL-SCNC: 4.6 MMOL/L — SIGNIFICANT CHANGE UP (ref 3.5–5)
RBC # BLD: 2.39 M/UL — LOW (ref 4.2–5.4)
RBC # FLD: 13.3 % — SIGNIFICANT CHANGE UP (ref 11.5–14.5)
SODIUM SERPL-SCNC: 138 MMOL/L — SIGNIFICANT CHANGE UP (ref 135–146)
WBC # BLD: 4.21 K/UL — LOW (ref 4.8–10.8)
WBC # FLD AUTO: 4.21 K/UL — LOW (ref 4.8–10.8)

## 2024-04-15 PROCEDURE — 93970 EXTREMITY STUDY: CPT | Mod: 26

## 2024-04-15 PROCEDURE — 99221 1ST HOSP IP/OBS SF/LOW 40: CPT

## 2024-04-15 PROCEDURE — 99221 1ST HOSP IP/OBS SF/LOW 40: CPT | Mod: GC

## 2024-04-15 PROCEDURE — 99223 1ST HOSP IP/OBS HIGH 75: CPT

## 2024-04-15 PROCEDURE — 93925 LOWER EXTREMITY STUDY: CPT | Mod: 26

## 2024-04-15 RX ORDER — ALBUTEROL 90 UG/1
2 AEROSOL, METERED ORAL EVERY 6 HOURS
Refills: 0 | Status: DISCONTINUED | OUTPATIENT
Start: 2024-04-15 | End: 2024-04-17

## 2024-04-15 RX ORDER — APIXABAN 2.5 MG/1
1 TABLET, FILM COATED ORAL
Refills: 0 | DISCHARGE

## 2024-04-15 RX ORDER — VANCOMYCIN HCL 1 G
1250 VIAL (EA) INTRAVENOUS EVERY 12 HOURS
Refills: 0 | Status: DISCONTINUED | OUTPATIENT
Start: 2024-04-15 | End: 2024-04-17

## 2024-04-15 RX ORDER — ACETAMINOPHEN 500 MG
975 TABLET ORAL EVERY 8 HOURS
Refills: 0 | Status: DISCONTINUED | OUTPATIENT
Start: 2024-04-15 | End: 2024-04-17

## 2024-04-15 RX ORDER — HYDROMORPHONE HYDROCHLORIDE 2 MG/ML
2 INJECTION INTRAMUSCULAR; INTRAVENOUS; SUBCUTANEOUS ONCE
Refills: 0 | Status: DISCONTINUED | OUTPATIENT
Start: 2024-04-15 | End: 2024-04-15

## 2024-04-15 RX ORDER — METRONIDAZOLE 500 MG
500 TABLET ORAL EVERY 8 HOURS
Refills: 0 | Status: DISCONTINUED | OUTPATIENT
Start: 2024-04-15 | End: 2024-04-17

## 2024-04-15 RX ORDER — HYDROMORPHONE HYDROCHLORIDE 2 MG/ML
8 INJECTION INTRAMUSCULAR; INTRAVENOUS; SUBCUTANEOUS EVERY 4 HOURS
Refills: 0 | Status: DISCONTINUED | OUTPATIENT
Start: 2024-04-15 | End: 2024-04-16

## 2024-04-15 RX ORDER — METHADONE HYDROCHLORIDE 40 MG/1
50 TABLET ORAL THREE TIMES A DAY
Refills: 0 | Status: DISCONTINUED | OUTPATIENT
Start: 2024-04-15 | End: 2024-04-17

## 2024-04-15 RX ORDER — ALBUTEROL 90 UG/1
2 AEROSOL, METERED ORAL
Refills: 0 | DISCHARGE

## 2024-04-15 RX ORDER — CEFTRIAXONE 500 MG/1
1000 INJECTION, POWDER, FOR SOLUTION INTRAMUSCULAR; INTRAVENOUS EVERY 24 HOURS
Refills: 0 | Status: DISCONTINUED | OUTPATIENT
Start: 2024-04-15 | End: 2024-04-17

## 2024-04-15 RX ORDER — HYDROMORPHONE HYDROCHLORIDE 2 MG/ML
0.5 INJECTION INTRAMUSCULAR; INTRAVENOUS; SUBCUTANEOUS ONCE
Refills: 0 | Status: DISCONTINUED | OUTPATIENT
Start: 2024-04-15 | End: 2024-04-15

## 2024-04-15 RX ORDER — METHADONE HYDROCHLORIDE 40 MG/1
50 TABLET ORAL ONCE
Refills: 0 | Status: DISCONTINUED | OUTPATIENT
Start: 2024-04-15 | End: 2024-04-15

## 2024-04-15 RX ORDER — APIXABAN 2.5 MG/1
5 TABLET, FILM COATED ORAL EVERY 12 HOURS
Refills: 0 | Status: DISCONTINUED | OUTPATIENT
Start: 2024-04-15 | End: 2024-04-17

## 2024-04-15 RX ADMIN — APIXABAN 5 MILLIGRAM(S): 2.5 TABLET, FILM COATED ORAL at 05:41

## 2024-04-15 RX ADMIN — Medication 100 MILLIGRAM(S): at 21:48

## 2024-04-15 RX ADMIN — CEFTRIAXONE 100 MILLIGRAM(S): 500 INJECTION, POWDER, FOR SOLUTION INTRAMUSCULAR; INTRAVENOUS at 18:00

## 2024-04-15 RX ADMIN — METHADONE HYDROCHLORIDE 30 MILLIGRAM(S): 40 TABLET ORAL at 00:54

## 2024-04-15 RX ADMIN — APIXABAN 5 MILLIGRAM(S): 2.5 TABLET, FILM COATED ORAL at 18:06

## 2024-04-15 RX ADMIN — HYDROMORPHONE HYDROCHLORIDE 0.5 MILLIGRAM(S): 2 INJECTION INTRAMUSCULAR; INTRAVENOUS; SUBCUTANEOUS at 00:54

## 2024-04-15 RX ADMIN — Medication 1 APPLICATION(S): at 17:51

## 2024-04-15 RX ADMIN — METHADONE HYDROCHLORIDE 50 MILLIGRAM(S): 40 TABLET ORAL at 22:59

## 2024-04-15 RX ADMIN — Medication 166.67 MILLIGRAM(S): at 17:59

## 2024-04-15 RX ADMIN — METHADONE HYDROCHLORIDE 50 MILLIGRAM(S): 40 TABLET ORAL at 14:34

## 2024-04-15 RX ADMIN — Medication 975 MILLIGRAM(S): at 05:41

## 2024-04-15 RX ADMIN — HYDROMORPHONE HYDROCHLORIDE 2 MILLIGRAM(S): 2 INJECTION INTRAMUSCULAR; INTRAVENOUS; SUBCUTANEOUS at 10:39

## 2024-04-15 RX ADMIN — HYDROMORPHONE HYDROCHLORIDE 8 MILLIGRAM(S): 2 INJECTION INTRAMUSCULAR; INTRAVENOUS; SUBCUTANEOUS at 16:10

## 2024-04-15 RX ADMIN — HYDROMORPHONE HYDROCHLORIDE 8 MILLIGRAM(S): 2 INJECTION INTRAMUSCULAR; INTRAVENOUS; SUBCUTANEOUS at 20:23

## 2024-04-15 NOTE — PATIENT PROFILE ADULT - FALL HARM RISK - HARM RISK INTERVENTIONS

## 2024-04-15 NOTE — CONSULT NOTE ADULT - ASSESSMENT
Assessment: 63F ex-smoker w/ PMHx COPD on 3L NC, h/o Opioid Abuse, Chronic Pain, h/o Lung Ca s/p Radiation (~2022) and h/o Anal Ca s/p Chemo/Radiation (~2021) presents to ED For bilateral leg pain and bilateral leg swelling. Burn consulted for wound care recommendations.     Plan:  Wound care washing with soap and water, xeroform/adaptic, kerlex and ACE twice daily by nursing  Compression, elevation and ambulation as tolerated   No indication for surgical debridement at this time  IV abx as indicated/ per ID  Remainder of care per primary team Assessment: 63F ex-smoker w/ PMHx COPD on 3L NC, h/o Opioid Abuse, Chronic Pain, h/o Lung Ca s/p Radiation (~2022) and h/o Anal Ca s/p Chemo/Radiation (~2021) presents to ED For bilateral leg pain and bilateral leg swelling. Burn consulted for wound care recommendations.     Plan:  Wound care washing with soap and water, apply silvadene, adaptic, kerlex and ACE twice daily by nursing  Compression, elevation and ambulation as tolerated   No indication for surgical debridement at this time  IV abx as indicated/ per ID  Remainder of care per primary team Assessment: 63F ex-smoker w/ PMHx COPD on 3L NC, h/o Opioid Abuse, Chronic Pain, h/o Lung Ca s/p Radiation (~2022) and h/o Anal Ca s/p Chemo/Radiation (~2021) presents to ED For bilateral leg pain and bilateral leg swelling. Burn consulted for wound care recommendations.     Plan:  Wound care washing with soap and water, apply silvadene, adaptic, kerlex and ACE once daily by nursing  Compression, elevation and ambulation as tolerated   No indication for surgical debridement at this time  IV abx as indicated/ per ID  Remainder of care per primary team

## 2024-04-15 NOTE — CONSULT NOTE ADULT - SUBJECTIVE AND OBJECTIVE BOX
HPI: 63F with PMH of COPD on 3L NC, opioid use disorder, chronic pain, lung and anal cancer on RT and chemo, here with bilateral leg pain and swelling. Was seen by pain management as outpatient and started on methadone and dilaudid for pain. Palliative called for pain mangement.      PERTINENT PM/SXH:       FAMILY HISTORY:  no pertinent family history      SOCIAL HISTORY:   Significant other/partner[ ]  Children[ ]  Rastafarian/Spirituality:  Substance hx:  [ ]   Tobacco hx:  [ ]   Alcohol hx: [ ]   Living Situation: [ ]Home  [ ]Long term care  [ ]Rehab [ ]Other  Home Services: [ ] HHA [ ] Alisha RN [ ] Hospice  Occupation:  Home Opioid hx:  [ ] Y [ ] N [ ] I-Stop Reference No: [id number]  A	N	O	12/16/2023	12/18/2023	hydromorphone 4 mg tablet	120	20	Harvey Benton	[id number]	Medicare	[facility] #[id number]  A	N	O	12/16/2023	12/18/2023	methadone hcl 10 mg tablet	210	14	Harvey Benton	[id number]	[facility] #[id number]  B	Y	O	04/11/2024	04/13/2024	methadone hcl 10 mg tablet	90	30	Aimee Toledo	[id number]	Medicare	[id number]  B	N	O	05/17/2023	05/17/2023	hydromorphone 4 mg tablet	180	30	Harvey Benton	[id number]	Medicare	[id number]    ADVANCE DIRECTIVES:    [ ] Full Code [ ] DNR  MOLST  [ ]  Living Will  [ ]   DECISION MAKER(s):  [ ] Health Care Proxy(s)  [ ] Surrogate(s)  [ ] Guardian           Name(s): Phone Number(s):    BASELINE (I)ADL(s) (prior to admission):  [place]: [ ]Total  [ ] Moderate [ ]Dependent  Palliative Performance Status Version 2:         %    http://npcrc.org/files/news/palliative_performance_scale_ppsv2.pdf    Allergies    penicillins (Unknown)    Intolerances    MEDICATIONS  (STANDING):  acetaminophen     Tablet .. 975 milliGRAM(s) Oral every 8 hours  apixaban 5 milliGRAM(s) Oral every 12 hours  methadone    Tablet 50 milliGRAM(s) Oral once    MEDICATIONS  (PRN):  albuterol    90 MICROgram(s) HFA Inhaler 2 Puff(s) Inhalation every 6 hours PRN for bronchospasm  aluminum hydroxide/magnesium hydroxide/simethicone Suspension 30 milliLiter(s) Oral every 4 hours PRN Dyspepsia  melatonin 3 milliGRAM(s) Oral at bedtime PRN Insomnia  ondansetron Injectable 4 milliGRAM(s) IV Push every 8 hours PRN Nausea and/or Vomiting      PRESENT SYMPTOMS: [ ]Unable to obtain due to poor mentation   Source if other than patient:  [ ]Family   [ ]Team   [ X]All review of systems negative including pain and dyspnea unless noted below    All components of pain assessment below addressed. Blank spaces indicate that the patient did/could not complete the assessment.  Pain: [ ]yes [ ]no  QOL impact -   Location -                    Aggravating factors -  Quality -  Radiation -  Timing-  Severity (0-10 scale):  Minimal acceptable level (0-10 scale):     CPOT:    https://www.[facility].org/getattachment/[email or web address]-[email or web address]/Critical-Care-Pain-Observation-Tool-(CPOT)    PAIN AD Score:   http://geriatrictoolkit.[facility]/cog/painad.pdf (press ctrl +  left click to view)    Dyspnea:                           [ ]None[ ]Mild [ ]Moderate [ ]Severe     Respiratory Distress Observation Scale (RDOS):   A score of 0 to 2 signifies little or no respiratory distress, 3 signifies mild distress, scores 4 to 6 indicate moderate distress, and scores greater than 7 signify severe distress  [email or web address]://[email or web address]/sites/default/files/PDFS/063915-pgwftxakshv-vtvifjyb-ydpoxksidqn-fgfgq.pdf    Anxiety:                             [ ]None[ ]Mild [ ]Moderate [ ]Severe   Fatigue:                             [ ]None[ ]Mild [ ]Moderate [ ]Severe   Nausea:                             [ ]None[ ]Mild [ ]Moderate [ ]Severe   Loss of appetite:              [ ]None[ ]Mild [ ]Moderate [ ]Severe   Constipation:                    [ ]None[ ]Mild [ ]Moderate [ ]Severe    Other Symptoms:      Palliative Performance Status Version 2:         %    http://[email or web address].org/files/news/palliative_performance_scale_ppsv2.pdf  PHYSICAL EXAM:  Vital Signs Last 24 Hrs  T(C): 36.3 (15 Apr 2024 07:35), Max: 36.8 (14 Apr 2024 16:18)  T(F): 97.4 (15 Apr 2024 07:35), Max: 98.2 (14 Apr 2024 16:18)  HR: 91 (15 Apr 2024 07:35) (86 - 97)  BP: 138/78 (15 Apr 2024 07:35) (130/60 - 180/73)  BP(mean): --  RR: 18 (15 Apr 2024 07:35) (16 - 18)  SpO2: 96% (15 Apr 2024 07:35) (96% - 99%)    Parameters below as of 15 Apr 2024 07:35  Patient On (Oxygen Delivery Method): room air     I&O's Summary      GENERAL:  [X ] No acute distress [ ]Lethargic  [ ]Unarousable  [ ]Verbal  [ ]Non-Verbal [ ]Cachexia    BEHAVIORAL/PSYCH:  [ ]Alert and Oriented x  [ ] Anxiety [ ] Delirium [ ] Agitation [X ] Calm   EYES: [ ] No scleral icterus [ ] Scleral icterus [ ] Closed  ENMT:  [ ]Dry mouth  [ ]No external oral lesions [ X] No external ear or nose lesions  CARDIOVASCULAR:  [ ]Regular [ ]Irregular [ ]Tachy [ ]Not Tachy  [ ]Tacho [ ] Edema [ ] No edema  PULMONARY:  [ ]Tachypnea  [ ]Audible excessive secretions [X ] No labored breathing [ ] labored breathing  GASTROINTESTINAL: [ ]Soft  [ ]Distended  [ X]Not distended [ ]Non tender [ ]Tender  MUSCULOSKELETAL: [ ]No clubbing [ ] clubbing  [ X] No cyanosis [ ] cyanosis  NEUROLOGIC: [ ]No focal deficits  [ ]Follows commands  [ ]Does not follow commands  [ ]Cognitive impairment  [ ]Dysphagia  [ ]Dysarthria  [ ]Paresis   SKIN: [ ] Jaundiced [X ] Non-jaundiced [ ]Rash [ ]No Rash [ ] Warm [ ] Dry  MISC/LINES: [ ] ET tube [ ] Trach [ ]NGT/OGT [ ]PEG [ ]David    CRITICAL CARE:  [ ] Shock Present  [ ]Septic [ ]Cardiogenic [ ]Neurologic [ ]Hypovolemic  [ ]  Vasopressors [ ]  Inotropes   [ ]Respiratory failure present [ ]Mechanical ventilation [ ]Non-invasive ventilatory support [ ]High flow  [ ]Acute  [ ]Chronic [ ]Hypoxic  [ ]Hypercarbic [ ]Other  [ ]Other organ failure     LABS: reviewed by me                        7.4    4.21  )-----------( 117      ( 15 Apr 2024 07:06 )             23.8   04-15    138  |  95<L>  |  25<H>  ----------------------------<  76  4.6   |  31  |  0.8    Ca    8.9      15 Apr 2024 07:06  Mg     1.9     04-15    TPro  7.1  /  Alb  3.5  /  TBili  0.2  /  DBili  x   /  AST  24  /  ALT  15  /  AlkPhos  101  04-14      Urinalysis Basic - ( 15 Apr 2024 07:06 )    Color: x / Appearance: x / SG: x / pH: x  Gluc: 76 mg/dL / Ketone: x  / Bili: x / Urobili: x   Blood: x / Protein: x / Nitrite: x   Leuk Esterase: x / RBC: x / WBC x   Sq Epi: x / Non Sq Epi: x / Bacteria: x      RADIOLOGY & ADDITIONAL STUDIES:   no CXR available for review  PROTEIN CALORIE MALNUTRITION PRESENT: [ ]mild [ ]moderate [ ]severe [ ]underweight [ ]morbid obesity  https://www.andeal.org/vault/2440/web/files/ONC/Table_Clinical%20Characteristics%20to%20Document%20Malnutrition-White%20JV%20et%20al%202012.pdf      Weight (kg): 81.6 (04-14-24 @ 13:44)    [ ]PPSV2 < or = to 30% [ ]significant weight loss  [ ]poor nutritional intake  [ ]anasarca      [ ]Artificial Nutrition          Palliative Care Spiritual/Emotional Screening Tool Question  Severity (0-4):                    OR                    [X ] Unable to determine/NA  Score of 2 or greater indicates recommendation of Chaplaincy referral  Chaplaincy Referral: [ ] Yes [ ] Refused [ ] Following     Caregiver Theresa:  [ ] Yes [ ] No    OR    [x ] Unable to determine. Will assess at later time if appropriate.  Social Work Referral [ ]  Patient and Family Centered Care Referral [ ]    Anticipatory Grief Present: [ ] Yes [ ] No    OR     [ x] Unable to determine. Will assess at later time if appropriate.  Social Work Referral [ ]  Patient and Family Centered Care Referral [ ]    REFERRALS:   [ ]Chaplaincy  [ ]Hospice  [ ]Child Life  [ ]Social Work  [ ]Case management [ ]Holistic Therapy     Palliative care education provided to patient and/or family    Goals of Care Document:     ______________  Esau Mann MD  Palliative Medicine  [facility]   of Geriatric and Palliative Medicine  [phone]

## 2024-04-15 NOTE — CONSULT NOTE ADULT - ASSESSMENT
63F with PMH of COPD on 3L NC, opioid use disorder, chronic pain, lung and anal cancer on RT and chemo, here with bilateral leg pain and swelling. Was seen by pain management as outpatient and started on methadone and dilaudid for pain. Palliative called for pain management

## 2024-04-15 NOTE — CHART NOTE - NSCHARTNOTEFT_GEN_A_CORE
Patient states she sees Dr. Toledo on [address] for management of her chronic pain. Dr. Toledo has been coordinating care with Dr. Harvey Benton [phone]), patient's previous palliative and pain management doctor in [place]. Provider spoke with Dr. Benton on the phone and he confirmed that patient was placed on Methadone 50mg TID for long term pain management and Dilaudid 8 mg PO q4 hours PRN for breakthrough pain. Dr. Benton reports that patient has been adherent to all recommendations and medications. The current regimen has been working for patient.

## 2024-04-15 NOTE — CONSULT NOTE ADULT - SUBJECTIVE AND OBJECTIVE BOX
HPI:  63F ex-smoker w/ PMHx COPD on 3L NC, h/o Opioid Abuse, Chronic Pain, h/o Lung Ca s/p Radiation (~2022) and h/o Anal Ca s/p Chemo/Radiation (~2021) presents to ED For bilateral leg pain and bilateral leg swelling. Patient admits to having LE weeping, redness and pain for the past few days with associated subjective fever. Her PCP started her on clindamycin which she took 2 days worth. States she was admitted to [facility] 3 months ago with similar symptoms and treated with IV antibiotics. Of note, patient was recently seen by pain management and started on Methadone and Dilaudid for pain. She has been unable to ambulate due to pain (baseline rolling walker).    Vitals: Temp 97.9F, /72, HR 95, RR 16, SpO2 99% on RA    Labs: Hgb 7.7 (unknown baseline), Platelet 123 (unknown baseline), Serum Glucose 68, Cr 1.0 (unknown baseline)    Imaging: Xray of b/l Tib-Fib shows no fracture    In the ED:  - Aztreonam and Vanc IV x1  - Morphine 6mg IV x1    Admitted to medicine and burn consulted for management of b/l LE wound    Allergies    penicillins (Unknown)    Intolerances      PAST MEDICAL & SURGICAL HISTORY:      Labs:                        7.4    4.21  )-----------( 117      ( 15 Apr 2024 07:06 )             23.8     04-15    138  |  95<L>  |  25<H>  ----------------------------<  76  4.6   |  31  |  0.8    Ca    8.9      15 Apr 2024 07:06  Mg     1.9     04-15    TPro  7.1  /  Alb  3.5  /  TBili  0.2  /  DBili  x   /  AST  24  /  ALT  15  /  AlkPhos  101  04-14        Physical exam:  Constitutional: patient resting comfortably in NAD  Resp: unlabored on RA  Cardiac: RRR  Abd: soft, nt and ND  Wound: bilateral LE's with evidence of chronic venous insufficiency. LLE with open partial thickness venous stasis ulcer measuring 5x8 cm with some surrounding erythema. No purulence or fluctuance. No eschar or significant amount of devitalized tissue.          HPI:  63F ex-smoker w/ PMHx COPD on 3L NC, h/o Opioid Abuse, Chronic Pain, h/o Lung Ca s/p Radiation (~2022) and h/o Anal Ca s/p Chemo/Radiation (~2021) presents to ED For bilateral leg pain and bilateral leg swelling. Patient admits to having LE weeping, redness and pain for the past few days with associated subjective fever. Her PCP started her on clindamycin which she took 2 days worth. States she was admitted to [facility] 3 months ago with similar symptoms and treated with IV antibiotics. Of note, patient was recently seen by pain management and started on Methadone and Dilaudid for pain. She has been unable to ambulate due to pain (baseline rolling walker).    Vitals: Temp 97.9F, /72, HR 95, RR 16, SpO2 99% on RA    Labs: Hgb 7.7 (unknown baseline), Platelet 123 (unknown baseline), Serum Glucose 68, Cr 1.0 (unknown baseline)    Imaging: Xray of b/l Tib-Fib shows no fracture    In the ED:  - Aztreonam and Vanc IV x1  - Morphine 6mg IV x1    Admitted to medicine and burn consulted for management of b/l LE wound    Allergies    penicillins (Unknown)    Intolerances      PAST MEDICAL & SURGICAL HISTORY:      Labs:                        7.4    4.21  )-----------( 117      ( 15 Apr 2024 07:06 )             23.8     04-15    138  |  95<L>  |  25<H>  ----------------------------<  76  4.6   |  31  |  0.8    Ca    8.9      15 Apr 2024 07:06  Mg     1.9     04-15    TPro  7.1  /  Alb  3.5  /  TBili  0.2  /  DBili  x   /  AST  24  /  ALT  15  /  AlkPhos  101  04-14        Physical exam:  Constitutional: patient resting comfortably in NAD  Resp: unlabored on RA  Cardiac: RRR  Abd: soft, nt and ND  Wound: bilateral LE's with evidence of chronic venous insufficiency. LLE with open partial thickness venous stasis ulcer measuring 5x8 cm with some surrounding erythema and yellow exudate. No purulence or fluctuance. No eschar or significant amount of devitalized tissue.

## 2024-04-16 DIAGNOSIS — J44.9 CHRONIC OBSTRUCTIVE PULMONARY DISEASE, UNSPECIFIED: ICD-10-CM

## 2024-04-16 DIAGNOSIS — C21.0 MALIGNANT NEOPLASM OF ANUS, UNSPECIFIED: ICD-10-CM

## 2024-04-16 DIAGNOSIS — L97.929 NON-PRESSURE CHRONIC ULCER OF UNSPECIFIED PART OF LEFT LOWER LEG WITH UNSPECIFIED SEVERITY: ICD-10-CM

## 2024-04-16 DIAGNOSIS — R52 PAIN, UNSPECIFIED: ICD-10-CM

## 2024-04-16 DIAGNOSIS — D69.6 THROMBOCYTOPENIA, UNSPECIFIED: ICD-10-CM

## 2024-04-16 DIAGNOSIS — Z51.5 ENCOUNTER FOR PALLIATIVE CARE: ICD-10-CM

## 2024-04-16 DIAGNOSIS — F11.10 OPIOID ABUSE, UNCOMPLICATED: ICD-10-CM

## 2024-04-16 DIAGNOSIS — Z79.899 OTHER LONG TERM (CURRENT) DRUG THERAPY: ICD-10-CM

## 2024-04-16 DIAGNOSIS — C34.90 MALIGNANT NEOPLASM OF UNSPECIFIED PART OF UNSPECIFIED BRONCHUS OR LUNG: ICD-10-CM

## 2024-04-16 DIAGNOSIS — Z71.89 OTHER SPECIFIED COUNSELING: ICD-10-CM

## 2024-04-16 LAB
ANION GAP SERPL CALC-SCNC: 11 MMOL/L — SIGNIFICANT CHANGE UP (ref 7–14)
BUN SERPL-MCNC: 19 MG/DL — SIGNIFICANT CHANGE UP (ref 10–20)
CALCIUM SERPL-MCNC: 9 MG/DL — SIGNIFICANT CHANGE UP (ref 8.4–10.5)
CHLORIDE SERPL-SCNC: 92 MMOL/L — LOW (ref 98–110)
CO2 SERPL-SCNC: 33 MMOL/L — HIGH (ref 17–32)
CREAT SERPL-MCNC: 0.7 MG/DL — SIGNIFICANT CHANGE UP (ref 0.7–1.5)
EGFR: 96 ML/MIN/1.73M2 — SIGNIFICANT CHANGE UP
GLUCOSE SERPL-MCNC: 79 MG/DL — SIGNIFICANT CHANGE UP (ref 70–99)
HCT VFR BLD CALC: 25 % — LOW (ref 37–47)
HGB BLD-MCNC: 7.7 G/DL — LOW (ref 12–16)
MAGNESIUM SERPL-MCNC: 1.9 MG/DL — SIGNIFICANT CHANGE UP (ref 1.8–2.4)
MCHC RBC-ENTMCNC: 30.8 G/DL — LOW (ref 32–37)
MCHC RBC-ENTMCNC: 30.9 PG — SIGNIFICANT CHANGE UP (ref 27–31)
MCV RBC AUTO: 100.4 FL — HIGH (ref 81–99)
NRBC # BLD: 0 /100 WBCS — SIGNIFICANT CHANGE UP (ref 0–0)
PLATELET # BLD AUTO: 120 K/UL — LOW (ref 130–400)
PMV BLD: 9.8 FL — SIGNIFICANT CHANGE UP (ref 7.4–10.4)
POTASSIUM SERPL-MCNC: 4.4 MMOL/L — SIGNIFICANT CHANGE UP (ref 3.5–5)
POTASSIUM SERPL-SCNC: 4.4 MMOL/L — SIGNIFICANT CHANGE UP (ref 3.5–5)
RBC # BLD: 2.49 M/UL — LOW (ref 4.2–5.4)
RBC # FLD: 13.2 % — SIGNIFICANT CHANGE UP (ref 11.5–14.5)
SODIUM SERPL-SCNC: 136 MMOL/L — SIGNIFICANT CHANGE UP (ref 135–146)
VANCOMYCIN TROUGH SERPL-MCNC: 16.6 UG/ML — HIGH (ref 5–10)
WBC # BLD: 3.65 K/UL — LOW (ref 4.8–10.8)
WBC # FLD AUTO: 3.65 K/UL — LOW (ref 4.8–10.8)

## 2024-04-16 PROCEDURE — 93010 ELECTROCARDIOGRAM REPORT: CPT

## 2024-04-16 PROCEDURE — 99223 1ST HOSP IP/OBS HIGH 75: CPT

## 2024-04-16 PROCEDURE — 99232 SBSQ HOSP IP/OBS MODERATE 35: CPT

## 2024-04-16 RX ORDER — METHADONE HYDROCHLORIDE 40 MG/1
3 TABLET ORAL
Refills: 0 | DISCHARGE

## 2024-04-16 RX ORDER — METHADONE HYDROCHLORIDE 40 MG/1
5 TABLET ORAL
Refills: 0 | DISCHARGE

## 2024-04-16 RX ORDER — HYDROMORPHONE HYDROCHLORIDE 2 MG/ML
12 INJECTION INTRAMUSCULAR; INTRAVENOUS; SUBCUTANEOUS EVERY 4 HOURS
Refills: 0 | Status: DISCONTINUED | OUTPATIENT
Start: 2024-04-16 | End: 2024-04-17

## 2024-04-16 RX ADMIN — CEFTRIAXONE 100 MILLIGRAM(S): 500 INJECTION, POWDER, FOR SOLUTION INTRAMUSCULAR; INTRAVENOUS at 18:02

## 2024-04-16 RX ADMIN — Medication 975 MILLIGRAM(S): at 12:18

## 2024-04-16 RX ADMIN — HYDROMORPHONE HYDROCHLORIDE 8 MILLIGRAM(S): 2 INJECTION INTRAMUSCULAR; INTRAVENOUS; SUBCUTANEOUS at 12:18

## 2024-04-16 RX ADMIN — Medication 100 MILLIGRAM(S): at 12:18

## 2024-04-16 RX ADMIN — HYDROMORPHONE HYDROCHLORIDE 12 MILLIGRAM(S): 2 INJECTION INTRAMUSCULAR; INTRAVENOUS; SUBCUTANEOUS at 18:01

## 2024-04-16 RX ADMIN — Medication 1 APPLICATION(S): at 05:40

## 2024-04-16 RX ADMIN — Medication 100 MILLIGRAM(S): at 05:39

## 2024-04-16 RX ADMIN — APIXABAN 5 MILLIGRAM(S): 2.5 TABLET, FILM COATED ORAL at 18:01

## 2024-04-16 RX ADMIN — Medication 975 MILLIGRAM(S): at 22:01

## 2024-04-16 RX ADMIN — HYDROMORPHONE HYDROCHLORIDE 8 MILLIGRAM(S): 2 INJECTION INTRAMUSCULAR; INTRAVENOUS; SUBCUTANEOUS at 03:20

## 2024-04-16 RX ADMIN — HYDROMORPHONE HYDROCHLORIDE 8 MILLIGRAM(S): 2 INJECTION INTRAMUSCULAR; INTRAVENOUS; SUBCUTANEOUS at 08:11

## 2024-04-16 RX ADMIN — Medication 166.67 MILLIGRAM(S): at 05:39

## 2024-04-16 RX ADMIN — Medication 166.67 MILLIGRAM(S): at 18:02

## 2024-04-16 RX ADMIN — Medication 100 MILLIGRAM(S): at 22:05

## 2024-04-16 RX ADMIN — APIXABAN 5 MILLIGRAM(S): 2.5 TABLET, FILM COATED ORAL at 05:39

## 2024-04-16 RX ADMIN — METHADONE HYDROCHLORIDE 50 MILLIGRAM(S): 40 TABLET ORAL at 05:40

## 2024-04-16 RX ADMIN — HYDROMORPHONE HYDROCHLORIDE 12 MILLIGRAM(S): 2 INJECTION INTRAMUSCULAR; INTRAVENOUS; SUBCUTANEOUS at 22:03

## 2024-04-16 RX ADMIN — METHADONE HYDROCHLORIDE 50 MILLIGRAM(S): 40 TABLET ORAL at 13:52

## 2024-04-16 RX ADMIN — Medication 975 MILLIGRAM(S): at 22:43

## 2024-04-16 RX ADMIN — HYDROMORPHONE HYDROCHLORIDE 12 MILLIGRAM(S): 2 INJECTION INTRAMUSCULAR; INTRAVENOUS; SUBCUTANEOUS at 22:43

## 2024-04-16 RX ADMIN — METHADONE HYDROCHLORIDE 50 MILLIGRAM(S): 40 TABLET ORAL at 22:02

## 2024-04-16 NOTE — PHYSICAL THERAPY INITIAL EVALUATION ADULT - GAIT DISTANCE, PT EVAL
2 ft fwd/ bkwd, then 2 ft side stepping after rest break. Pt declined further ambulation 2* to pain . Pain meds not due at this time but pt reports pain meds only provide brief relief. Pt reports ambulated to bathroom with staff earlier confirmed by Lorenza GOMES. Pt fwd flexed on RW.

## 2024-04-16 NOTE — CONSULT NOTE ADULT - PROBLEM SELECTOR RECOMMENDATION 2
- supportive care  - states she followed as [facility]  - c/w IV emperic abx for wound, including IV vanco, monitor levels for toxicity

## 2024-04-16 NOTE — PHYSICAL THERAPY INITIAL EVALUATION ADULT - PERTINENT HX OF CURRENT PROBLEM, REHAB EVAL
Pt is a 63F ex-smoker w/ PMHx COPD on 3L NC, h/o Opioid Abuse, Chronic Pain, h/o Lung Ca s/p Radiation (~2022) and h/o Anal Ca s/p Chemo/Radiation (~2021) presents to ED For bilateral leg pain and bilateral leg swelling. Patient admits to having LE weeping, redness and pain for the past few days with associated subjective fever. Her PCP started her on clindamycin which she took 2 days worth. States she was admitted to [facility] 3 months ago with similar symptoms and treated with IV antibiotics. Of note, patient was recently seen by pain management and started on Methadone and Dilaudid for pain. She has been unable to ambulate due to pain (baseline rolling walker).

## 2024-04-16 NOTE — PHYSICAL THERAPY INITIAL EVALUATION ADULT - GENERAL OBSERVATIONS, REHAB EVAL
11:22- 11:49 Pt encountered semifowler in bed in NAD. + IV locked for ambulation by Lorenza GOMES, + primafit, + O2 at 3lpm, B ace bandages.

## 2024-04-16 NOTE — PROGRESS NOTE ADULT - PROBLEM SELECTOR PLAN 2
albuterol prn  nc to keep spo2 >88 with leukopenia  no baseline to compare  will check hapto, peripheral smear; otherwise outpt f/u  trend

## 2024-04-16 NOTE — CONSULT NOTE ADULT - ASSESSMENT
ASSESSMENT  63F ex-smoker w/ PMHx COPD on 3L NC, h/o Opioid Abuse, Chronic Pain, h/o Lung Ca s/p Radiation (~2022) and h/o Anal Ca s/p Chemo/Radiation (~2021) presents to ED For bilateral leg pain and bilateral leg swelling.    IMPRESSION  #Chronic Venous Stasis insufficiency   #COPD on 3L nC  #Opioid use disorder on methadone   #Lung cancer s/p radiation  #Anal Cancer s/p chemo/radiation     #Abx allergy:     RECOMMENDATIONS  This is a preliminary incomplete pended note, all final recommendations to follow after interview and examination of the patient.    Please call or message on [employer] Teams if with any questions.  Spectra [phone]     ASSESSMENT  63F ex-smoker w/ PMHx COPD on 3L NC, h/o Opioid Abuse, Chronic Pain, h/o Lung Ca s/p Radiation (~2022) and h/o Anal Ca s/p Chemo/Radiation (~2021) presents to ED For bilateral leg pain and bilateral leg swelling.    IMPRESSION  #Chronic Venous Stasis insufficiency   #COPD on 3L nC  #Opioid use disorder on methadone   #Lung cancer s/p radiation  #Anal Cancer s/p chemo/radiation     #Abx allergy:     RECOMMENDATIONS  - suspect erythema from venous insufficiency -- as wound cx is negative, would stop antibiotics and monitor   - local wound care with compression bandages   - will need outpatient vascular follow-up     Please call or message on [employer] Teams if with any questions.  Spectra [phone]

## 2024-04-16 NOTE — CONSULT NOTE ADULT - SUBJECTIVE AND OBJECTIVE BOX
ELVIA MINAL  63y, Female  Allergy: penicillins (Unknown)      CHIEF COMPLAINT: b/l LE wound (16 Apr 2024 12:38)      LOS  2d    HPI:  63F ex-smoker w/ PMHx COPD on 3L NC, h/o Opioid Abuse, Chronic Pain, h/o Lung Ca s/p Radiation (~2022) and h/o Anal Ca s/p Chemo/Radiation (~2021) presents to ED For bilateral leg pain and bilateral leg swelling. Patient admits to having LE weeping, redness and pain for the past few days with associated subjective fever. Her PCP started her on clindamycin which she took 2 days worth. States she was admitted to [facility] 3 months ago with similar symptoms and treated with IV antibiotics. Of note, patient was recently seen by pain management and started on Methadone and Dilaudid for pain. She has been unable to ambulate due to pain (baseline rolling walker).    Vitals: Temp 97.9F, /72, HR 95, RR 16, SpO2 99% on RA    Labs: Hgb 7.7 (unknown baseline), Platelet 123 (unknown baseline), Serum Glucose 68, Cr 1.0 (unknown baseline)    Imaging: Xray of b/l Tib-Fib shows no fracture    In the ED:  - Aztreonam and Vanc IV x1  - Morphine 6mg IV x1    Admitted to medicine for management of b/l LE wound. (14 Apr 2024 23:09)      INFECTIOUS DISEASE HISTORY:  History as above.    PAST MEDICAL & SURGICAL HISTORY:  Anal cancer      No significant past surgical history          FAMILY HISTORY  No pertinent family history in first degree relatives        SOCIAL HISTORY  Social History:  no alcohol, smoking hx (14 Apr 2024 23:09)        ROS  General: Denies rigors, nightsweats  HEENT: Denies headache, rhinorrhea, sore throat, eye pain  CV: Denies CP, palpitations  PULM: Denies wheezing, hemoptysis  GI: Denies hematemesis, hematochezia, melena  : Denies discharge, hematuria  MSK: Denies arthralgias, myalgias  SKIN: Denies rash, lesions  NEURO: Denies paresthesias, weakness  PSYCH: Denies depression, anxiety    VITALS:  T(F): 97.8, Max: 98.1 (04-15-24 @ 20:21)  HR: 89  BP: 123/58  RR: 20Vital Signs Last 24 Hrs  T(C): 36.6 (16 Apr 2024 14:08), Max: 36.7 (15 Apr 2024 20:21)  T(F): 97.8 (16 Apr 2024 14:08), Max: 98.1 (15 Apr 2024 20:21)  HR: 89 (16 Apr 2024 14:08) (88 - 93)  BP: 123/58 (16 Apr 2024 14:08) (123/58 - 144/76)  BP(mean): 101 (16 Apr 2024 06:16) (101 - 101)  RR: 20 (16 Apr 2024 14:08) (18 - 20)  SpO2: 99% (16 Apr 2024 06:16) (98% - 99%)    Parameters below as of 16 Apr 2024 14:08  Patient On (Oxygen Delivery Method): room air        PHYSICAL EXAM:  Gen: NAD, resting in bed  HEENT: Normocephalic, atraumatic  Neck: supple, no lymphadenopathy  CV: Regular rate & regular rhythm  Lungs: decreased BS at bases, no fremitus  Abdomen: Soft, BS present  Ext: Warm, well perfused  Neuro: non focal, awake  Skin: no rash, no erythema  Lines: no phlebitis    TESTS & MEASUREMENTS:                        7.7    3.65  )-----------( 120      ( 16 Apr 2024 06:03 )             25.0     04-16    136  |  92<L>  |  19  ----------------------------<  79  4.4   |  33<H>  |  0.7    Ca    9.0      16 Apr 2024 06:03  Mg     1.9     04-16    TPro  7.1  /  Alb  3.5  /  TBili  0.2  /  DBili  x   /  AST  24  /  ALT  15  /  AlkPhos  101  04-14      LIVER FUNCTIONS - ( 14 Apr 2024 16:04 )  Alb: 3.5 g/dL / Pro: 7.1 g/dL / ALK PHOS: 101 U/L / ALT: 15 U/L / AST: 24 U/L / GGT: x           Urinalysis Basic - ( 16 Apr 2024 06:03 )    Color: x / Appearance: x / SG: x / pH: x  Gluc: 79 mg/dL / Ketone: x  / Bili: x / Urobili: x   Blood: x / Protein: x / Nitrite: x   Leuk Esterase: x / RBC: x / WBC x   Sq Epi: x / Non Sq Epi: x / Bacteria: x        Culture - Blood (collected 04-14-24 @ 16:04)  Source: .Blood Blood  Preliminary Report (04-16-24 @ 02:02):    No growth at 24 hours        Lactate, Blood: 0.9 mmol/L (04-14-24 @ 16:04)      INFECTIOUS DISEASES TESTING      RADIOLOGY & ADDITIONAL TESTS:  I have personally reviewed the last Chest xray  CXR      CT      CARDIOLOGY TESTING      MEDICATIONS  acetaminophen     Tablet .. 975 Oral every 8 hours  apixaban 5 Oral every 12 hours  cefTRIAXone   IVPB 1000 IV Intermittent every 24 hours  methadone    Tablet 50 Oral three times a day  metroNIDAZOLE  IVPB 500 IV Intermittent every 8 hours  silver sulfADIAZINE 1% Cream 1 Topical two times a day  vancomycin  IVPB 1250 IV Intermittent every 12 hours      Weight  Weight (kg): 81.6 (04-14-24 @ 13:44)    ANTIBIOTICS:  cefTRIAXone   IVPB 1000 milliGRAM(s) IV Intermittent every 24 hours  metroNIDAZOLE  IVPB 500 milliGRAM(s) IV Intermittent every 8 hours  vancomycin  IVPB 1250 milliGRAM(s) IV Intermittent every 12 hours      ALLERGIES:  penicillins (Unknown)       ELVIA MINAL  63y, Female  Allergy: penicillins (Unknown)      CHIEF COMPLAINT: b/l LE wound (16 Apr 2024 12:38)      LOS  2d    HPI:  63F ex-smoker w/ PMHx COPD on 3L NC, h/o Opioid Abuse, Chronic Pain, h/o Lung Ca s/p Radiation (~2022) and h/o Anal Ca s/p Chemo/Radiation (~2021) presents to ED For bilateral leg pain and bilateral leg swelling. Patient admits to having LE weeping, redness and pain for the past few days with associated subjective fever. Her PCP started her on clindamycin which she took 2 days worth. States she was admitted to [facility] 3 months ago with similar symptoms and treated with IV antibiotics. Of note, patient was recently seen by pain management and started on Methadone and Dilaudid for pain. She has been unable to ambulate due to pain (baseline rolling walker).    Vitals: Temp 97.9F, /72, HR 95, RR 16, SpO2 99% on RA    Labs: Hgb 7.7 (unknown baseline), Platelet 123 (unknown baseline), Serum Glucose 68, Cr 1.0 (unknown baseline)    Imaging: Xray of b/l Tib-Fib shows no fracture    In the ED:  - Aztreonam and Vanc IV x1  - Morphine 6mg IV x1    Admitted to medicine for management of b/l LE wound. (14 Apr 2024 23:09)      INFECTIOUS DISEASE HISTORY:  History as above.    PAST MEDICAL & SURGICAL HISTORY:  Anal cancer      No significant past surgical history          FAMILY HISTORY  No pertinent family history in first degree relatives        SOCIAL HISTORY  Social History:  no alcohol, smoking hx (14 Apr 2024 23:09)        ROS  General: Denies rigors, nightsweats  HEENT: Denies headache, rhinorrhea, sore throat, eye pain  CV: Denies CP, palpitations  PULM: Denies wheezing, hemoptysis  GI: Denies hematemesis, hematochezia, melena  : Denies discharge, hematuria  MSK: Denies arthralgias, myalgias  SKIN: left lower extremity with superficial skin ulcer with clear exudate   NEURO: Denies paresthesias, weakness  PSYCH: Denies depression, anxiety    VITALS:  T(F): 97.8, Max: 98.1 (04-15-24 @ 20:21)  HR: 89  BP: 123/58  RR: 20Vital Signs Last 24 Hrs  T(C): 36.6 (16 Apr 2024 14:08), Max: 36.7 (15 Apr 2024 20:21)  T(F): 97.8 (16 Apr 2024 14:08), Max: 98.1 (15 Apr 2024 20:21)  HR: 89 (16 Apr 2024 14:08) (88 - 93)  BP: 123/58 (16 Apr 2024 14:08) (123/58 - 144/76)  BP(mean): 101 (16 Apr 2024 06:16) (101 - 101)  RR: 20 (16 Apr 2024 14:08) (18 - 20)  SpO2: 99% (16 Apr 2024 06:16) (98% - 99%)    Parameters below as of 16 Apr 2024 14:08  Patient On (Oxygen Delivery Method): room air        PHYSICAL EXAM:  Gen: NAD, resting in bed  HEENT: Normocephalic, atraumatic  Neck: supple, no lymphadenopathy  CV: Regular rate & regular rhythm  Lungs: decreased BS at bases, no fremitus  Abdomen: Soft, BS present  Ext: Warm, well perfused  Neuro: non focal, awake  Skin: no rash, no erythema  Lines: no phlebitis    TESTS & MEASUREMENTS:                        7.7    3.65  )-----------( 120      ( 16 Apr 2024 06:03 )             25.0     04-16    136  |  92<L>  |  19  ----------------------------<  79  4.4   |  33<H>  |  0.7    Ca    9.0      16 Apr 2024 06:03  Mg     1.9     04-16    TPro  7.1  /  Alb  3.5  /  TBili  0.2  /  DBili  x   /  AST  24  /  ALT  15  /  AlkPhos  101  04-14      LIVER FUNCTIONS - ( 14 Apr 2024 16:04 )  Alb: 3.5 g/dL / Pro: 7.1 g/dL / ALK PHOS: 101 U/L / ALT: 15 U/L / AST: 24 U/L / GGT: x           Urinalysis Basic - ( 16 Apr 2024 06:03 )    Color: x / Appearance: x / SG: x / pH: x  Gluc: 79 mg/dL / Ketone: x  / Bili: x / Urobili: x   Blood: x / Protein: x / Nitrite: x   Leuk Esterase: x / RBC: x / WBC x   Sq Epi: x / Non Sq Epi: x / Bacteria: x        Culture - Blood (collected 04-14-24 @ 16:04)  Source: .Blood Blood  Preliminary Report (04-16-24 @ 02:02):    No growth at 24 hours        Lactate, Blood: 0.9 mmol/L (04-14-24 @ 16:04)      INFECTIOUS DISEASES TESTING      RADIOLOGY & ADDITIONAL TESTS:  I have personally reviewed the last Chest xray  CXR      CT      CARDIOLOGY TESTING      MEDICATIONS  acetaminophen     Tablet .. 975 Oral every 8 hours  apixaban 5 Oral every 12 hours  cefTRIAXone   IVPB 1000 IV Intermittent every 24 hours  methadone    Tablet 50 Oral three times a day  metroNIDAZOLE  IVPB 500 IV Intermittent every 8 hours  silver sulfADIAZINE 1% Cream 1 Topical two times a day  vancomycin  IVPB 1250 IV Intermittent every 12 hours      Weight  Weight (kg): 81.6 (04-14-24 @ 13:44)    ANTIBIOTICS:  cefTRIAXone   IVPB 1000 milliGRAM(s) IV Intermittent every 24 hours  metroNIDAZOLE  IVPB 500 milliGRAM(s) IV Intermittent every 8 hours  vancomycin  IVPB 1250 milliGRAM(s) IV Intermittent every 12 hours      ALLERGIES:  penicillins (Unknown)

## 2024-04-16 NOTE — CONSULT NOTE ADULT - PROBLEM SELECTOR RECOMMENDATION 3
- will follow  ______________  Esau Mann MD  Palliative Medicine  [facility]   [facility]  [phone]

## 2024-04-16 NOTE — PROGRESS NOTE ADULT - NSPROGADDITIONALINFOA_GEN_ALL_CORE
#Progress Note Handoff  Pending (specify): iv abx, id eval, bcx, wcx  Family discussion: d/w pt, daughter at bedside  Disposition: snf vs. home .

## 2024-04-16 NOTE — CONSULT NOTE ADULT - SUBJECTIVE AND OBJECTIVE BOX
HPI: 63F with PMH of COPD on 3L NC, opioid use disorder, chronic pain, lung and anal cancer on RT and chemo, here with bilateral leg pain and swelling. Was seen by pain management as outpatient and started on methadone and dilaudid for pain. Palliative called for pain management      PERTINENT PM/SXH:   as above    FAMILY HISTORY:  no pertinent family history      SOCIAL HISTORY:   Significant other/partner[ ]  Children[X ]  [place]/Spirituality:  Substance hx:  [ ]   Tobacco hx:  [ ]   Alcohol hx: [ ]   Living Situation: [ ]Home  [ ]Long term care  [ ]Rehab [ ]Other  Home Services: [ ] HHA [ ] Alisha RN [ ] Hospice  Occupation:  Home Opioid hx:  [ ] Y [ ] N [ ] I-Stop Reference No: [id number]  A	N	O	12/16/2023	12/18/2023	hydromorphone 4 mg tablet	120	20	Harvey Benton	[id number]	Medicare	[id number]  A	N	O	12/16/2023	12/18/2023	methadone hcl 10 mg tablet	210	14	Harvey Benton	[id number]	[id number]  B	Y	O	04/11/2024	04/13/2024	methadone hcl 10 mg tablet	90	30	Aimee Toledo	[id number]	Medicare	[id number]  B	N	O	05/17/2023	05/17/2023	hydromorphone 4 mg tablet	180	30	Harvey Benton	[id number]	Medicare	[id number]    ADVANCE DIRECTIVES:    [ ] Full Code [ ] DNR  MOLST  [ ]  Living Will  [ ]   DECISION MAKER(s):  [ ] Health Care Proxy(s)  [ ] Surrogate(s)  [ ] Guardian           Name(s): Phone Number(s):    BASELINE (I)ADL(s) (prior to admission):  [place]: [ ]Total  [ ] Moderate [ ]Dependent  Palliative Performance Status Version 2:         %    http://npcrc.org/files/news/palliative_performance_scale_ppsv2.pdf    Allergies    penicillins (Unknown)    Intolerances    MEDICATIONS  (STANDING):  acetaminophen     Tablet .. 975 milliGRAM(s) Oral every 8 hours  apixaban 5 milliGRAM(s) Oral every 12 hours  cefTRIAXone   IVPB 1000 milliGRAM(s) IV Intermittent every 24 hours  methadone    Tablet 50 milliGRAM(s) Oral three times a day  metroNIDAZOLE  IVPB 500 milliGRAM(s) IV Intermittent every 8 hours  silver sulfADIAZINE 1% Cream 1 Application(s) Topical two times a day  vancomycin  IVPB 1250 milliGRAM(s) IV Intermittent every 12 hours    MEDICATIONS  (PRN):  albuterol    90 MICROgram(s) HFA Inhaler 2 Puff(s) Inhalation every 6 hours PRN for bronchospasm  aluminum hydroxide/magnesium hydroxide/simethicone Suspension 30 milliLiter(s) Oral every 4 hours PRN Dyspepsia  HYDROmorphone   Tablet 8 milliGRAM(s) Oral every 4 hours PRN Severe Pain (7 - 10)  melatonin 3 milliGRAM(s) Oral at bedtime PRN Insomnia  ondansetron Injectable 4 milliGRAM(s) IV Push every 8 hours PRN Nausea and/or Vomiting      PRESENT SYMPTOMS: [ ]Unable to obtain due to poor mentation   Source if other than patient:  [ ]Family   [ ]Team   [ X]All review of systems negative including pain and dyspnea unless noted below    All components of pain assessment below addressed. Blank spaces indicate that the patient did/could not complete the assessment.  Pain: [X ]yes [ ]no  QOL impact - limits ambulation  Location -  L leg              Aggravating factors -  Quality - pressure  Radiation - groin  Timing- constant  Severity (0-10 scale): 9/10 down to 8/10 post-dilaudid  Minimal acceptable level (0-10 scale): 5/10    CPOT:    https://www.[facility].org/getattachment/[email or web address]-5g6m-[email or web address]/Critical-Care-Pain-Observation-Tool-(CPOT)    PAIN AD Score:   http://geriatrictoolkit.[facility]/cog/painad.pdf (press ctrl +  left click to view)    Dyspnea:                           [ ]None[ ]Mild [ ]Moderate [ ]Severe     Respiratory Distress Observation Scale (RDOS):   A score of 0 to 2 signifies little or no respiratory distress, 3 signifies mild distress, scores 4 to 6 indicate moderate distress, and scores greater than 7 signify severe distress  https://www.[facility].ca/sites/default/files/PDFS/800108-lwyfakqnsgv-twjtqmoy-qojedbigfri-pyhid.pdf    Anxiety:                             [ ]None[ ]Mild [ ]Moderate [ ]Severe   Fatigue:                             [ ]None[ ]Mild [ ]Moderate [ ]Severe   Nausea:                             [ ]None[ ]Mild [ ]Moderate [ ]Severe   Loss of appetite:              [ ]None[ ]Mild [ ]Moderate [ ]Severe   Constipation:                    [ ]None[ ]Mild [ ]Moderate [ ]Severe    Other Symptoms:      Palliative Performance Status Version 2:         %    http://[facility].org/files/news/palliative_performance_scale_ppsv2.pdf  PHYSICAL EXAM:  Vital Signs Last 24 Hrs  T(C): 36.4 (16 Apr 2024 06:16), Max: 36.7 (15 Apr 2024 15:20)  T(F): 97.6 (16 Apr 2024 06:16), Max: 98.1 (15 Apr 2024 15:20)  HR: 88 (16 Apr 2024 06:16) (86 - 93)  BP: 144/76 (16 Apr 2024 06:16) (138/82 - 144/76)  BP(mean): 101 (16 Apr 2024 06:16) (101 - 106)  RR: 18 (16 Apr 2024 06:16) (18 - 18)  SpO2: 99% (16 Apr 2024 06:16) (97% - 99%)    Parameters below as of 16 Apr 2024 06:16  Patient On (Oxygen Delivery Method): nasal cannula  O2 Flow (L/min): 3        GENERAL:  [X ] No acute distress [ ]Lethargic  [ ]Unarousable  [ ]Verbal  [ ]Non-Verbal [ ]Cachexia    BEHAVIORAL/PSYCH:  [ ]Alert and Oriented x  [ ] Anxiety [ ] Delirium [ ] Agitation [X ] Calm   EYES: [X ] No scleral icterus [ ] Scleral icterus [ ] Closed  ENMT:  [ ]Dry mouth  [ ]No external oral lesions [ X] No external ear or nose lesions  CARDIOVASCULAR:  [ ]Regular [ ]Irregular [ ]Tachy [ ]Not Tachy  [ ]Tacho [ ] Edema [ ] No edema  PULMONARY:  [ ]Tachypnea  [ ]Audible excessive secretions [X ] No labored breathing [ ] labored breathing  GASTROINTESTINAL: [ ]Soft  [ ]Distended  [ X]Not distended [ ]Non tender [ ]Tender  MUSCULOSKELETAL: [ ]No clubbing [ ] clubbing  [ X] No cyanosis [ ] cyanosis  NEUROLOGIC: [ ]No focal deficits  [X ]Follows commands  [ ]Does not follow commands  [ ]Cognitive impairment  [ ]Dysphagia  [ ]Dysarthria  [ ]Paresis   SKIN: [ ] Jaundiced [X ] Non-jaundiced [ ]Rash [ ]No Rash [ ] Warm [ ] Dry  MISC/LINES: [ ] ET tube [ ] Trach [ ]NGT/OGT [ ]PEG [ ]David    CRITICAL CARE:  [ ] Shock Present  [ ]Septic [ ]Cardiogenic [ ]Neurologic [ ]Hypovolemic  [ ]  Vasopressors [ ]  Inotropes   [ ]Respiratory failure present [ ]Mechanical ventilation [ ]Non-invasive ventilatory support [ ]High flow  [ ]Acute  [ ]Chronic [ ]Hypoxic  [ ]Hypercarbic [ ]Other  [ ]Other organ failure     LABS: labs reviewed and WNL                                   7.7    3.65  )-----------( 120      ( 16 Apr 2024 06:03 )             25.0     04-16    136  |  92<L>  |  19  ----------------------------<  79  4.4   |  33<H>  |  0.7    Ca    9.0      16 Apr 2024 06:03  Mg     1.9     04-16          RADIOLOGY & ADDITIONAL STUDIES:   no CXR available for review    PROTEIN CALORIE MALNUTRITION PRESENT: [ ]mild [ ]moderate [ ]severe [ ]underweight [ ]morbid obesity  https://www.andeal.org/vault/2440/web/files/ONC/Table_Clinical%20Characteristics%20to%20Document%20Malnutrition-White%20JV%20et%20al%202012.pdf      Weight (kg): 81.6 (04-14-24 @ 13:44)    [ ]PPSV2 < or = to 30% [ ]significant weight loss  [ ]poor nutritional intake  [ ]anasarca      [ ]Artificial Nutrition          Palliative Care Spiritual/Emotional Screening Tool Question  Severity (0-4):                    OR                    [X ] Unable to determine/NA  Score of 2 or greater indicates recommendation of Chaplaincy referral  Chaplaincy Referral: [ ] Yes [ ] Refused [ ] Following     Caregiver [place]:  [ ] Yes [ ] No    OR    [x ] Unable to determine. Will assess at later time if appropriate.  Social Work Referral [ ]  Patient and Family Centered Care Referral [ ]    Anticipatory Grief Present: [ ] Yes [ ] No    OR     [ x] Unable to determine. Will assess at later time if appropriate.  Social Work Referral [ ]  Patient and Family Centered Care Referral [ ]    REFERRALS:   [ ]Chaplaincy  [ ]Hospice  [ ]Child Life  [ ]Social Work  [ ]Case management [ ]Holistic Therapy     Palliative care education provided to patient and/or family    Goals of Care Document:     ______________  Esau Mann MD  Palliative Medicine  [facility]   of Geriatric and Palliative Medicine  [phone]

## 2024-04-16 NOTE — CONSULT NOTE ADULT - PROBLEM SELECTOR RECOMMENDATION 9
- is on PO dilaudid 8mg, would benefit from increased dose given suboptimal pain management, increase to 12mg PO PRn  - c/w methadone 50mg TID, will consider increase this week  - monitor BMs  - check EKG for baseline before methadone changes  - followed with pain management (Dr. Benton) in [place] where her daughter lives; will need pain management f/u here

## 2024-04-16 NOTE — PHYSICAL THERAPY INITIAL EVALUATION ADULT - ADDITIONAL COMMENTS
Pt lives with 11 yr old grandson in an apt. , has no steps. Pt reports daughter comes over to help with IADLs. Pt ambulates with rollator, shower chair, w/c, reports ordered hospital bed. + O2 at home. Pt reports limited ambulation (bathroom and back throughout day) .

## 2024-04-17 ENCOUNTER — TRANSCRIPTION ENCOUNTER (OUTPATIENT)
Age: 64
End: 2024-04-17

## 2024-04-17 VITALS
OXYGEN SATURATION: 99 % | RESPIRATION RATE: 18 BRPM | TEMPERATURE: 97 F | DIASTOLIC BLOOD PRESSURE: 67 MMHG | HEART RATE: 84 BPM | SYSTOLIC BLOOD PRESSURE: 134 MMHG

## 2024-04-17 LAB
ANION GAP SERPL CALC-SCNC: 6 MMOL/L — LOW (ref 7–14)
BUN SERPL-MCNC: 22 MG/DL — HIGH (ref 10–20)
CALCIUM SERPL-MCNC: 8.9 MG/DL — SIGNIFICANT CHANGE UP (ref 8.4–10.5)
CHLORIDE SERPL-SCNC: 96 MMOL/L — LOW (ref 98–110)
CO2 SERPL-SCNC: 37 MMOL/L — HIGH (ref 17–32)
CREAT SERPL-MCNC: 0.8 MG/DL — SIGNIFICANT CHANGE UP (ref 0.7–1.5)
CULTURE RESULTS: NO GROWTH — SIGNIFICANT CHANGE UP
EGFR: 82 ML/MIN/1.73M2 — SIGNIFICANT CHANGE UP
GLUCOSE SERPL-MCNC: 78 MG/DL — SIGNIFICANT CHANGE UP (ref 70–99)
HAPTOGLOB SERPL-MCNC: 232 MG/DL — HIGH (ref 34–200)
HCT VFR BLD CALC: 26.7 % — LOW (ref 37–47)
HGB BLD-MCNC: 8.1 G/DL — LOW (ref 12–16)
MAGNESIUM SERPL-MCNC: 2.1 MG/DL — SIGNIFICANT CHANGE UP (ref 1.8–2.4)
MCHC RBC-ENTMCNC: 30.3 G/DL — LOW (ref 32–37)
MCHC RBC-ENTMCNC: 30.6 PG — SIGNIFICANT CHANGE UP (ref 27–31)
MCV RBC AUTO: 100.8 FL — HIGH (ref 81–99)
NRBC # BLD: 0 /100 WBCS — SIGNIFICANT CHANGE UP (ref 0–0)
PLATELET # BLD AUTO: 116 K/UL — LOW (ref 130–400)
PMV BLD: 9.3 FL — SIGNIFICANT CHANGE UP (ref 7.4–10.4)
POTASSIUM SERPL-MCNC: 4.9 MMOL/L — SIGNIFICANT CHANGE UP (ref 3.5–5)
POTASSIUM SERPL-SCNC: 4.9 MMOL/L — SIGNIFICANT CHANGE UP (ref 3.5–5)
RBC # BLD: 2.65 M/UL — LOW (ref 4.2–5.4)
RBC # FLD: 13.2 % — SIGNIFICANT CHANGE UP (ref 11.5–14.5)
SODIUM SERPL-SCNC: 139 MMOL/L — SIGNIFICANT CHANGE UP (ref 135–146)
SPECIMEN SOURCE: SIGNIFICANT CHANGE UP
WBC # BLD: 3.36 K/UL — LOW (ref 4.8–10.8)
WBC # FLD AUTO: 3.36 K/UL — LOW (ref 4.8–10.8)

## 2024-04-17 PROCEDURE — 99239 HOSP IP/OBS DSCHRG MGMT >30: CPT

## 2024-04-17 PROCEDURE — 99232 SBSQ HOSP IP/OBS MODERATE 35: CPT

## 2024-04-17 RX ORDER — HYDROMORPHONE HYDROCHLORIDE 2 MG/ML
1 INJECTION INTRAMUSCULAR; INTRAVENOUS; SUBCUTANEOUS
Refills: 0 | DISCHARGE

## 2024-04-17 RX ORDER — NALOXONE HYDROCHLORIDE 4 MG/.1ML
1 SPRAY NASAL
Qty: 1 | Refills: 0
Start: 2024-04-17 | End: 2024-04-17

## 2024-04-17 RX ORDER — HYDROMORPHONE HYDROCHLORIDE 2 MG/ML
3 INJECTION INTRAMUSCULAR; INTRAVENOUS; SUBCUTANEOUS
Qty: 0 | Refills: 0 | DISCHARGE
Start: 2024-04-17

## 2024-04-17 RX ORDER — ACETAMINOPHEN 500 MG
3 TABLET ORAL
Qty: 0 | Refills: 0 | DISCHARGE
Start: 2024-04-17

## 2024-04-17 RX ORDER — ACETAMINOPHEN 500 MG
3 TABLET ORAL
Qty: 126 | Refills: 0
Start: 2024-04-17 | End: 2024-04-30

## 2024-04-17 RX ORDER — FUROSEMIDE 40 MG
20 TABLET ORAL ONCE
Refills: 0 | Status: COMPLETED | OUTPATIENT
Start: 2024-04-17 | End: 2024-04-17

## 2024-04-17 RX ADMIN — Medication 100 MILLIGRAM(S): at 05:37

## 2024-04-17 RX ADMIN — METHADONE HYDROCHLORIDE 50 MILLIGRAM(S): 40 TABLET ORAL at 14:33

## 2024-04-17 RX ADMIN — HYDROMORPHONE HYDROCHLORIDE 12 MILLIGRAM(S): 2 INJECTION INTRAMUSCULAR; INTRAVENOUS; SUBCUTANEOUS at 02:08

## 2024-04-17 RX ADMIN — HYDROMORPHONE HYDROCHLORIDE 12 MILLIGRAM(S): 2 INJECTION INTRAMUSCULAR; INTRAVENOUS; SUBCUTANEOUS at 11:35

## 2024-04-17 RX ADMIN — Medication 20 MILLIGRAM(S): at 14:33

## 2024-04-17 RX ADMIN — Medication 1 APPLICATION(S): at 05:36

## 2024-04-17 RX ADMIN — Medication 975 MILLIGRAM(S): at 07:22

## 2024-04-17 RX ADMIN — HYDROMORPHONE HYDROCHLORIDE 12 MILLIGRAM(S): 2 INJECTION INTRAMUSCULAR; INTRAVENOUS; SUBCUTANEOUS at 07:22

## 2024-04-17 RX ADMIN — HYDROMORPHONE HYDROCHLORIDE 12 MILLIGRAM(S): 2 INJECTION INTRAMUSCULAR; INTRAVENOUS; SUBCUTANEOUS at 03:19

## 2024-04-17 RX ADMIN — HYDROMORPHONE HYDROCHLORIDE 12 MILLIGRAM(S): 2 INJECTION INTRAMUSCULAR; INTRAVENOUS; SUBCUTANEOUS at 15:51

## 2024-04-17 RX ADMIN — Medication 975 MILLIGRAM(S): at 14:33

## 2024-04-17 RX ADMIN — Medication 975 MILLIGRAM(S): at 05:36

## 2024-04-17 RX ADMIN — Medication 166.67 MILLIGRAM(S): at 05:37

## 2024-04-17 RX ADMIN — APIXABAN 5 MILLIGRAM(S): 2.5 TABLET, FILM COATED ORAL at 05:36

## 2024-04-17 RX ADMIN — METHADONE HYDROCHLORIDE 50 MILLIGRAM(S): 40 TABLET ORAL at 05:35

## 2024-04-17 RX ADMIN — HYDROMORPHONE HYDROCHLORIDE 12 MILLIGRAM(S): 2 INJECTION INTRAMUSCULAR; INTRAVENOUS; SUBCUTANEOUS at 06:16

## 2024-04-17 NOTE — DISCHARGE NOTE NURSING/CASE MANAGEMENT/SOCIAL WORK - NSDCPEELIQUIS_GEN_ALL_CORE
Apixaban/Eliquis - Compliance/Apixaban/Eliquis - Dietary Advice/Apixaban/Eliquis - Follow up monitoring/Apixaban/Eliquis - Potential for adverse drug reactions and interactions 18-Dec-2018

## 2024-04-17 NOTE — DISCHARGE NOTE PROVIDER - CARE PROVIDERS DIRECT ADDRESSES
,DirectAddress_Unknown,[email or web address]

## 2024-04-17 NOTE — PROGRESS NOTE ADULT - PROBLEM SELECTOR PLAN 1
- c/w PO dilaudid 12mg PRN - used 4/24 hours  - c/w methadone at home dose  - patient will follow with pain management on [facility]  - monitor BMs
xray periosteal thickening tibia  bcx ntd  deep wcx  vanco, ceftriaxone, flagyl  no intervention per burn  art duplex  id eval

## 2024-04-17 NOTE — DISCHARGE NOTE PROVIDER - HOSPITAL COURSE
HPI:    63F ex-smoker w/ PMHx COPD on 3L NC, h/o Opioid Abuse, Chronic Pain, h/o Lung Ca s/p Radiation (~2022) and h/o Anal Ca s/p Chemo/Radiation (~2021) presents to ED For bilateral leg pain and bilateral leg swelling. Patient admits to having LE weeping, redness and pain for the past few days with associated subjective fever. Her PCP started her on clindamycin which she took 2 days worth. States she was admitted to [facility] 3 months ago with similar symptoms and treated with IV antibiotics. Of note, patient was recently seen by pain management and started on Methadone and Dilaudid for pain. She has been unable to ambulate due to pain (baseline rolling walker).    Vitals: Temp 97.9F, /72, HR 95, RR 16, SpO2 99% on RA    Labs: Hgb 7.7 (unknown baseline), Platelet 123 (unknown baseline), Serum Glucose 68, Cr 1.0 (unknown baseline)    Imaging: Xray of b/l Tib-Fib shows no fracture    In the ED:  - Aztreonam and Vanc IV x1  - Morphine 6mg IV x1    Admitted to medicine for management of b/l LE wound.    Hospital Course:    Pt evaluated in house for leg swelling and pain and difficulty ambulating. Pt evalauted by burn and ID for LE stasis ulcers, rec for local wound care, no abx, and outpt f/u with vascular surgery. Pt worked with PT, difficulty ambulating in general, rec for SIGIFREDO. Pt had arterial and venous duplex of LE, no DVT but both studies limited by pt tolerance and body habitus, respectively. Pt otherwise stable for discharge HPI:    63F ex-smoker w/ PMHx COPD on 3L NC, h/o Opioid Abuse, Chronic Pain, h/o Lung Ca s/p Radiation (~2022) and h/o Anal Ca s/p Chemo/Radiation (~2021) presents to ED For bilateral leg pain and bilateral leg swelling. Patient admits to having LE weeping, redness and pain for the past few days with associated subjective fever. Her PCP started her on clindamycin which she took 2 days worth. States she was admitted to [facility] 3 months ago with similar symptoms and treated with IV antibiotics. Of note, patient was recently seen by pain management and started on Methadone and Dilaudid for pain. She has been unable to ambulate due to pain (baseline rolling walker).    Vitals: Temp 97.9F, /72, HR 95, RR 16, SpO2 99% on RA    Labs: Hgb 7.7 (unknown baseline), Platelet 123 (unknown baseline), Serum Glucose 68, Cr 1.0 (unknown baseline)    Imaging: Xray of b/l Tib-Fib shows no fracture    In the ED:  - Aztreonam and Vanc IV x1  - Morphine 6mg IV x1    Admitted to medicine for management of b/l LE wound.    Hospital Course:    Pt evaluated in house for leg swelling and pain and difficulty ambulating. Pt evaluated by burn and ID for LE stasis ulcers, rec for local wound care, no abx, and outpt f/u with vascular surgery. Pt worked with PT, difficulty ambulating in general, rec for SIGIFREDO. Pt had arterial and venous duplex of LE, no DVT but both studies limited by pt tolerance and body habitus, respectively. Pt otherwise stable for discharge home with vns.

## 2024-04-17 NOTE — DISCHARGE NOTE PROVIDER - NSDCMRMEDTOKEN_GEN_ALL_CORE_FT
acetaminophen 325 mg oral tablet: 3 tab(s) orally every 8 hours  Albuterol (Eqv-Proventil HFA) 90 mcg/inh inhalation aerosol: 2 puff(s) inhaled every 6 hours as needed for  bronchospasm  Eliquis 5 mg oral tablet: 1 tab(s) orally 2 times a day  HYDROmorphone 4 mg oral tablet: 3 tab(s) orally every 4 hours As needed Severe Pain (7 - 10)  methadone 10 mg oral tablet: 5 tab(s) orally 3 times a day  silver sulfADIAZINE 1% topical cream: Apply topically to affected area 2 times a day apply to lower extremities   acetaminophen 325 mg oral tablet: 3 tab(s) orally every 8 hours  Albuterol (Eqv-Proventil HFA) 90 mcg/inh inhalation aerosol: 2 puff(s) inhaled every 6 hours as needed for  bronchospasm  Eliquis 5 mg oral tablet: 1 tab(s) orally 2 times a day  HYDROmorphone 4 mg oral tablet: 3 tab(s) orally every 4 hours As needed Severe Pain (7 - 10)  methadone 10 mg oral tablet: 5 tab(s) orally 3 times a day  Narcan 4 mg/0.1 mL nasal spray: 1 spray(s) intranasally once as needed for opioid overdose symptoms  silver sulfADIAZINE 1% topical cream: Apply topically to affected area 2 times a day apply to lower extremities

## 2024-04-17 NOTE — PROGRESS NOTE ADULT - ASSESSMENT
63F with PMH of COPD on 3L NC, opioid use disorder, chronic pain, lung and anal cancer on RT and chemo, here with bilateral leg pain and swelling. Was seen by pain management as outpatient and started on methadone and dilaudid for pain. Palliative called for pain management 
63F PMHx COPD on home o2, h/o opioid abuse, lung ca s/p RT 2022, anal ca s/p chemo, RT 2021 here with LLE ulcer.

## 2024-04-17 NOTE — DISCHARGE NOTE PROVIDER - PROVIDER TOKENS
PROVIDER:[TOKEN:[[id number]],FOLLOWUP:[1-3 days]],PROVIDER:[TOKEN:[[id number]],FOLLOWUP:[Routine]]

## 2024-04-17 NOTE — PROGRESS NOTE ADULT - PROBLEM SELECTOR PLAN 3
- will follow  ______________  Esau Mann MD  Palliative Medicine  [facility]   of Geriatric and Palliative Medicine  [phone]

## 2024-04-17 NOTE — DISCHARGE NOTE PROVIDER - CARE PROVIDER_API CALL
Jyoti Maddox  Internal Medicine  [address] [place] [address]  Phone: [phone]  Fax: [phone]  Follow Up Time: 1-3 days    Kwasi Lugo  Vascular Surgery  [address] [place] [address], [address]  [place] [address]  Phone: [phone]  Fax: [phone]  Follow Up Time: Routine

## 2024-04-17 NOTE — DISCHARGE NOTE PROVIDER - NSDCCPCAREPLAN_GEN_ALL_CORE_FT
PRINCIPAL DISCHARGE DIAGNOSIS  Diagnosis: Venous stasis dermatitis  Assessment and Plan of Treatment: -please continue with silvadene cream twice daily to lower extremities. Please keep legs elevated.  -Please follow up with vascular surgery as outpatient for further evaluation and treatment.      SECONDARY DISCHARGE DIAGNOSES  Diagnosis: Bilateral leg pain  Assessment and Plan of Treatment:

## 2024-04-17 NOTE — PROGRESS NOTE ADULT - SUBJECTIVE AND OBJECTIVE BOX
HPI: 63F with PMH of COPD on 3L NC, opioid use disorder, chronic pain, lung and anal cancer on RT and chemo, here with bilateral leg pain and swelling. Was seen by pain management as outpatient and started on methadone and dilaudid for pain. Palliative called for pain management      INTERVAL EVENTS  4/17/24: used dilaudid 12mg x 4/24 hours, states pain is more mangeable    ADVANCE DIRECTIVES:    [ ] Full Code [ ] DNR  MOLST  [ ]  Living Will  [ ]   DECISION MAKER(s):  [ ] Health Care Proxy(s)  [ ] Surrogate(s)  [ ] Guardian           Name(s): Phone Number(s):    BASELINE (I)ADL(s) (prior to admission):  [place]: [ ]Total  [ ] Moderate [ ]Dependent  Palliative Performance Status Version 2:         %    http://npcrc.org/files/news/palliative_performance_scale_ppsv2.pdf    Allergies    penicillins (Unknown)    Intolerances    MEDICATIONS  (STANDING):  acetaminophen     Tablet .. 975 milliGRAM(s) Oral every 8 hours  apixaban 5 milliGRAM(s) Oral every 12 hours  furosemide    Tablet 20 milliGRAM(s) Oral once  methadone    Tablet 50 milliGRAM(s) Oral three times a day  silver sulfADIAZINE 1% Cream 1 Application(s) Topical two times a day    MEDICATIONS  (PRN):  albuterol    90 MICROgram(s) HFA Inhaler 2 Puff(s) Inhalation every 6 hours PRN for bronchospasm  aluminum hydroxide/magnesium hydroxide/simethicone Suspension 30 milliLiter(s) Oral every 4 hours PRN Dyspepsia  HYDROmorphone   Tablet 12 milliGRAM(s) Oral every 4 hours PRN Severe Pain (7 - 10)  melatonin 3 milliGRAM(s) Oral at bedtime PRN Insomnia  ondansetron Injectable 4 milliGRAM(s) IV Push every 8 hours PRN Nausea and/or Vomiting    PRESENT SYMPTOMS: [ ]Unable to obtain due to poor mentation   Source if other than patient:  [ ]Family   [ ]Team   [ X]All review of systems negative including pain and dyspnea unless noted below    All components of pain assessment below addressed. Blank spaces indicate that the patient did/could not complete the assessment.  Pain: [X ]yes [ ]no  QOL impact - limits ambulation  Location -  L leg              Aggravating factors -  Quality - pressure  Radiation - groin  Timing- constant  Severity (0-10 scale): 9/10 down to 8/10 post-dilaudid  Minimal acceptable level (0-10 scale): 5/10    CPOT:    https://www.[email or web address]/getattachment/[email or web address]/Critical-Care-Pain-Observation-Tool-(CPOT)    PAIN AD Score:   [email or web address].[email or web address]/painad.pdf (press ctrl +  left click to view)    Dyspnea:                           [ ]None[ ]Mild [ ]Moderate [ ]Severe     Respiratory Distress Observation Scale (RDOS):   A score of 0 to 2 signifies little or no respiratory distress, 3 signifies mild distress, scores 4 to 6 indicate moderate distress, and scores greater than 7 signify severe distress  [email or web address]/[email or web address]-wacudyroikj-squjlkqt-cjmoohynwtb-ndzic.pdf    Anxiety:                             [ ]None[ ]Mild [ ]Moderate [ ]Severe   Fatigue:                             [ ]None[ ]Mild [ ]Moderate [ ]Severe   Nausea:                             [ ]None[ ]Mild [ ]Moderate [ ]Severe   Loss of appetite:              [ ]None[ ]Mild [ ]Moderate [ ]Severe   Constipation:                    [ ]None[ ]Mild [ ]Moderate [ ]Severe    Other Symptoms:      Palliative Performance Status Version 2:         %    http://npcrc.org/files/news/palliative_performance_scale_ppsv2.pdf  PHYSICAL EXAM:  Vital Signs Last 24 Hrs  T(C): 36.2 (17 Apr 2024 12:53), Max: 36.4 (16 Apr 2024 20:16)  T(F): 97.2 (17 Apr 2024 12:53), Max: 97.6 (16 Apr 2024 20:16)  HR: 84 (17 Apr 2024 12:53) (80 - 84)  BP: 134/67 (17 Apr 2024 12:53) (130/60 - 147/86)  BP(mean): 92 (17 Apr 2024 12:53) (92 - 109)  RR: 18 (17 Apr 2024 12:53) (18 - 18)  SpO2: 99% (17 Apr 2024 12:53) (99% - 99%)    Parameters below as of 17 Apr 2024 12:53  Patient On (Oxygen Delivery Method): room air      GENERAL:  [X ] No acute distress [ ]Lethargic  [ ]Unarousable  [ ]Verbal  [ ]Non-Verbal [ ]Cachexia    BEHAVIORAL/PSYCH:  [ ]Alert and Oriented x  [ ] Anxiety [ ] Delirium [ ] Agitation [X ] Calm   EYES: [X ] No scleral icterus [ ] Scleral icterus [ ] Closed  ENMT:  [ ]Dry mouth  [ ]No external oral lesions [ X] No external ear or nose lesions  CARDIOVASCULAR:  [ ]Regular [ ]Irregular [ ]Tachy [ ]Not Tachy  [ ]Tacho [ ] Edema [ ] No edema  PULMONARY:  [ ]Tachypnea  [ ]Audible excessive secretions [X ] No labored breathing [ ] labored breathing  GASTROINTESTINAL: [ ]Soft  [ ]Distended  [ X]Not distended [ ]Non tender [ ]Tender  MUSCULOSKELETAL: [ ]No clubbing [ ] clubbing  [ X] No cyanosis [ ] cyanosis  NEUROLOGIC: [ ]No focal deficits  [X ]Follows commands  [ ]Does not follow commands  [ ]Cognitive impairment  [ ]Dysphagia  [ ]Dysarthria  [ ]Paresis   SKIN: [ ] Jaundiced [X ] Non-jaundiced [ ]Rash [ ]No Rash [ ] Warm [ ] Dry  MISC/LINES: [ ] ET tube [ ] Trach [ ]NGT/OGT [ ]PEG [ ]David    CRITICAL CARE:  [ ] Shock Present  [ ]Septic [ ]Cardiogenic [ ]Neurologic [ ]Hypovolemic  [ ]  Vasopressors [ ]  Inotropes   [ ]Respiratory failure present [ ]Mechanical ventilation [ ]Non-invasive ventilatory support [ ]High flow  [ ]Acute  [ ]Chronic [ ]Hypoxic  [ ]Hypercarbic [ ]Other  [ ]Other organ failure     LABS: labs reviewed and WNL                                   8.1    3.36  )-----------( 116      ( 17 Apr 2024 06:14 )             26.7     04-17    139  |  96<L>  |  22<H>  ----------------------------<  78  4.9   |  37<H>  |  0.8    Ca    8.9      17 Apr 2024 06:14  Mg     2.1     04-17        RADIOLOGY & ADDITIONAL STUDIES:   no CXR available for review    PROTEIN CALORIE MALNUTRITION PRESENT: [ ]mild [ ]moderate [ ]severe [ ]underweight [ ]morbid obesity  https://www.andeal.org/vault/2440/web/files/ONC/Table_Clinical%20Characteristics%20to%20Document%20Malnutrition-White%20JV%20et%20al%202012.pdf      Weight (kg): 81.6 (04-14-24 @ 13:44)    [ ]PPSV2 < or = to 30% [ ]significant weight loss  [ ]poor nutritional intake  [ ]anasarca      [ ]Artificial Nutrition          Palliative Care Spiritual/Emotional Screening Tool Question  Severity (0-4):                    OR                    [X ] Unable to determine/NA  Score of 2 or greater indicates recommendation of Chaplaincy referral  Chaplaincy Referral: [ ] Yes [ ] Refused [ ] Following     Caregiver [place]:  [ ] Yes [ ] No    OR    [x ] Unable to determine. Will assess at later time if appropriate.  Social Work Referral [ ]  Patient and Family Centered Care Referral [ ]    Anticipatory Grief Present: [ ] Yes [ ] No    OR     [ x] Unable to determine. Will assess at later time if appropriate.  Social Work Referral [ ]  Patient and Family Centered Care Referral [ ]    REFERRALS:   [ ]Chaplaincy  [ ]Hospice  [ ]Child Life  [ ]Social Work  [ ]Case management [ ]Holistic Therapy     Palliative care education provided to patient and/or family    Goals of Care Document:     ______________  Esau Mann MD  Palliative Medicine  [facility]   of Geriatric and Palliative Medicine  [phone]  
INTERVAL HPI/OVERNIGHT EVENTS:    SUBJECTIVE: Patient seen and examined at bedside.     no cp, sob, abd pain, fever  no le pain, numbness, tingling, paresthesias    OBJECTIVE:    VITAL SIGNS:  Vital Signs Last 24 Hrs  T(C): 36.4 (16 Apr 2024 06:16), Max: 36.7 (15 Apr 2024 15:20)  T(F): 97.6 (16 Apr 2024 06:16), Max: 98.1 (15 Apr 2024 15:20)  HR: 88 (16 Apr 2024 06:16) (86 - 93)  BP: 144/76 (16 Apr 2024 06:16) (138/82 - 144/76)  BP(mean): 101 (16 Apr 2024 06:16) (101 - 106)  RR: 18 (16 Apr 2024 06:16) (18 - 18)  SpO2: 99% (16 Apr 2024 06:16) (97% - 99%)    Parameters below as of 16 Apr 2024 06:16  Patient On (Oxygen Delivery Method): nasal cannula  O2 Flow (L/min): 3        PHYSICAL EXAM:    General: NAD  HEENT: NC/AT; PERRL, clear conjunctiva  Neck: supple  Respiratory: CTA b/l  Cardiovascular: +S1/S2; RRR  Abdomen: soft, NT/ND; +BS x4  Extremities: WWP, 2+ peripheral pulses b/l; no LE edema  Skin: bandaged bl le  Neurological:    MEDICATIONS:  MEDICATIONS  (STANDING):  acetaminophen     Tablet .. 975 milliGRAM(s) Oral every 8 hours  apixaban 5 milliGRAM(s) Oral every 12 hours  cefTRIAXone   IVPB 1000 milliGRAM(s) IV Intermittent every 24 hours  methadone    Tablet 50 milliGRAM(s) Oral three times a day  metroNIDAZOLE  IVPB 500 milliGRAM(s) IV Intermittent every 8 hours  silver sulfADIAZINE 1% Cream 1 Application(s) Topical two times a day  vancomycin  IVPB 1250 milliGRAM(s) IV Intermittent every 12 hours    MEDICATIONS  (PRN):  albuterol    90 MICROgram(s) HFA Inhaler 2 Puff(s) Inhalation every 6 hours PRN for bronchospasm  aluminum hydroxide/magnesium hydroxide/simethicone Suspension 30 milliLiter(s) Oral every 4 hours PRN Dyspepsia  HYDROmorphone   Tablet 8 milliGRAM(s) Oral every 4 hours PRN Severe Pain (7 - 10)  melatonin 3 milliGRAM(s) Oral at bedtime PRN Insomnia  ondansetron Injectable 4 milliGRAM(s) IV Push every 8 hours PRN Nausea and/or Vomiting      ALLERGIES:  Allergies    penicillins (Unknown)    Intolerances        LABS:                        7.7    3.65  )-----------( 120      ( 16 Apr 2024 06:03 )             25.0     Hemoglobin: 7.7 g/dL (04-16 @ 06:03)  Hemoglobin: 7.4 g/dL (04-15 @ 07:06)  Hemoglobin: 7.7 g/dL (04-14 @ 16:04)    CBC Full  -  ( 16 Apr 2024 06:03 )  WBC Count : 3.65 K/uL  RBC Count : 2.49 M/uL  Hemoglobin : 7.7 g/dL  Hematocrit : 25.0 %  Platelet Count - Automated : 120 K/uL  Mean Cell Volume : 100.4 fL  Mean Cell Hemoglobin : 30.9 pg  Mean Cell Hemoglobin Concentration : 30.8 g/dL  Auto Neutrophil # : x  Auto Lymphocyte # : x  Auto Monocyte # : x  Auto Eosinophil # : x  Auto Basophil # : x  Auto Neutrophil % : x  Auto Lymphocyte % : x  Auto Monocyte % : x  Auto Eosinophil % : x  Auto Basophil % : x    04-16    136  |  92<L>  |  19  ----------------------------<  79  4.4   |  33<H>  |  0.7    Ca    9.0      16 Apr 2024 06:03  Mg     1.9     04-16    TPro  7.1  /  Alb  3.5  /  TBili  0.2  /  DBili  x   /  AST  24  /  ALT  15  /  AlkPhos  101  04-14    Creatinine Trend: 0.7<--, 0.8<--, 1.0<--  LIVER FUNCTIONS - ( 14 Apr 2024 16:04 )  Alb: 3.5 g/dL / Pro: 7.1 g/dL / ALK PHOS: 101 U/L / ALT: 15 U/L / AST: 24 U/L / GGT: x               hs Troponin:            Urinalysis Basic - ( 16 Apr 2024 06:03 )    Color: x / Appearance: x / SG: x / pH: x  Gluc: 79 mg/dL / Ketone: x  / Bili: x / Urobili: x   Blood: x / Protein: x / Nitrite: x   Leuk Esterase: x / RBC: x / WBC x   Sq Epi: x / Non Sq Epi: x / Bacteria: x      CSF:                      EKG:   MICROBIOLOGY:    Culture - Blood (collected 14 Apr 2024 16:04)  Source: .Blood Blood  Preliminary Report (16 Apr 2024 02:02):    No growth at 24 hours      IMAGING:      Labs, imaging, EKG personally reviewed    RADIOLOGY & ADDITIONAL TESTS: Reviewed.

## 2024-04-17 NOTE — DISCHARGE NOTE NURSING/CASE MANAGEMENT/SOCIAL WORK - PATIENT PORTAL LINK FT
You can access the FollowMyHealth Patient Portal offered by [facility] by registering at the following website: [email or web address]. By joining [facility]’s FollowMyHealth portal, you will also be able to view your health information using other applications (apps) compatible with our system.

## 2024-04-18 ENCOUNTER — NON-APPOINTMENT (OUTPATIENT)
Age: 64
End: 2024-04-18

## 2024-04-18 ENCOUNTER — TRANSCRIPTION ENCOUNTER (OUTPATIENT)
Age: 64
End: 2024-04-18

## 2024-04-20 LAB
CULTURE RESULTS: SIGNIFICANT CHANGE UP
SPECIMEN SOURCE: SIGNIFICANT CHANGE UP

## 2024-04-22 PROBLEM — C21.0 MALIGNANT NEOPLASM OF ANUS, UNSPECIFIED: Chronic | Status: ACTIVE | Noted: 2024-04-15

## 2024-04-23 ENCOUNTER — APPOINTMENT (OUTPATIENT)
Dept: CARE COORDINATION | Facility: HOME HEALTH | Age: 64
End: 2024-04-23

## 2024-04-24 ENCOUNTER — APPOINTMENT (OUTPATIENT)
Dept: HOME HEALTH SERVICES | Facility: HOME HEALTH | Age: 64
End: 2024-04-24

## 2024-05-01 ENCOUNTER — TRANSCRIPTION ENCOUNTER (OUTPATIENT)
Age: 64
End: 2024-05-01

## 2024-05-09 ENCOUNTER — TRANSCRIPTION ENCOUNTER (OUTPATIENT)
Age: 64
End: 2024-05-09

## 2024-05-15 ENCOUNTER — TRANSCRIPTION ENCOUNTER (OUTPATIENT)
Age: 64
End: 2024-05-15

## 2024-09-17 NOTE — ED ADULT NURSE NOTE - NSFALLCONCLUSION_ED_ALL_ED
Fall with Harm Risk
FAMILY HISTORY:  Father  Still living? No  Family history of arrhythmia, Age at diagnosis: Age Unknown  Family history of hypertension, Age at diagnosis: Age Unknown    Mother  Still living? No  Family history of diabetes mellitus, Age at diagnosis: Age Unknown